# Patient Record
Sex: FEMALE | Race: WHITE | NOT HISPANIC OR LATINO | Employment: FULL TIME | URBAN - METROPOLITAN AREA
[De-identification: names, ages, dates, MRNs, and addresses within clinical notes are randomized per-mention and may not be internally consistent; named-entity substitution may affect disease eponyms.]

---

## 2017-01-12 ENCOUNTER — TELEPHONE (OUTPATIENT)
Dept: FAMILY MEDICINE CLINIC | Facility: CLINIC | Age: 52
End: 2017-01-12

## 2017-01-12 NOTE — TELEPHONE ENCOUNTER
ab please check on this it looks like it was suppose to be done on 12/12/16 at [facility]. It is on your over due task. thanks

## 2017-01-18 ENCOUNTER — TELEPHONE (OUTPATIENT)
Dept: FAMILY MEDICINE CLINIC | Facility: CLINIC | Age: 52
End: 2017-01-18

## 2017-01-18 NOTE — TELEPHONE ENCOUNTER
Ebony, please check with [facility] from December we dont have the report yet. This is in your overdue task thanks

## 2017-03-22 RX ORDER — LEVOTHYROXINE SODIUM 112 UG/1
TABLET ORAL
Qty: 90 TABLET | Refills: 0 | Status: SHIPPED | OUTPATIENT
Start: 2017-03-22 | End: 2017-06-13 | Stop reason: SDUPTHER

## 2017-05-18 ENCOUNTER — APPOINTMENT (OUTPATIENT)
Dept: WOMENS IMAGING | Facility: HOSPITAL | Age: 52
End: 2017-05-18

## 2017-05-18 PROCEDURE — 77067 SCR MAMMO BI INCL CAD: CPT | Performed by: RADIOLOGY

## 2017-06-02 ENCOUNTER — OFFICE (AMBULATORY)
Dept: URBAN - METROPOLITAN AREA CLINIC 1 | Facility: CLINIC | Age: 52
End: 2017-06-02

## 2017-06-02 VITALS
SYSTOLIC BLOOD PRESSURE: 128 MMHG | DIASTOLIC BLOOD PRESSURE: 74 MMHG | HEIGHT: 61 IN | WEIGHT: 149 LBS | HEART RATE: 82 BPM

## 2017-06-02 DIAGNOSIS — R94.5 ABNORMAL RESULTS OF LIVER FUNCTION STUDIES: ICD-10-CM

## 2017-06-02 DIAGNOSIS — Z86.010 PERSONAL HISTORY OF COLONIC POLYPS: ICD-10-CM

## 2017-06-02 PROCEDURE — 99214 OFFICE O/P EST MOD 30 MIN: CPT | Performed by: INTERNAL MEDICINE

## 2017-06-13 NOTE — TELEPHONE ENCOUNTER
lov 12/6/2016    Levothyroxine     reza     *transferred pt up front to make future appt and labs

## 2017-06-14 RX ORDER — LEVOTHYROXINE SODIUM 112 UG/1
TABLET ORAL
Qty: 90 TABLET | Refills: 0 | Status: SHIPPED | OUTPATIENT
Start: 2017-06-14 | End: 2017-08-01 | Stop reason: SDUPTHER

## 2017-07-24 DIAGNOSIS — R73.9 HYPERGLYCEMIA: Primary | ICD-10-CM

## 2017-07-24 DIAGNOSIS — E03.9 HYPOTHYROIDISM, UNSPECIFIED TYPE: ICD-10-CM

## 2017-07-26 LAB
HBA1C MFR BLD: 5.3 % (ref 4.8–5.6)
T3FREE SERPL-MCNC: 3.1 PG/ML (ref 2–4.4)
T4 FREE SERPL-MCNC: 1.71 NG/DL (ref 0.82–1.77)
TSH SERPL DL<=0.005 MIU/L-ACNC: 0.4 UIU/ML (ref 0.45–4.5)

## 2017-07-29 LAB
ALBUMIN SERPL-MCNC: 4.5 G/DL (ref 3.5–5.5)
ALBUMIN/GLOB SERPL: 1.7 {RATIO} (ref 1.2–2.2)
ALP SERPL-CCNC: 76 IU/L (ref 39–117)
ALT SERPL-CCNC: 20 IU/L (ref 0–32)
AST SERPL-CCNC: 28 IU/L (ref 0–40)
BILIRUB SERPL-MCNC: 0.6 MG/DL (ref 0–1.2)
BUN SERPL-MCNC: 10 MG/DL (ref 6–24)
BUN/CREAT SERPL: 15 (ref 9–23)
CALCIUM SERPL-MCNC: 9.9 MG/DL (ref 8.7–10.2)
CHLORIDE SERPL-SCNC: 100 MMOL/L (ref 96–106)
CO2 SERPL-SCNC: 17 MMOL/L (ref 18–29)
CREAT SERPL-MCNC: 0.66 MG/DL (ref 0.57–1)
GLOBULIN SER CALC-MCNC: 2.7 G/DL (ref 1.5–4.5)
GLUCOSE SERPL-MCNC: 94 MG/DL (ref 65–99)
Lab: NORMAL
POTASSIUM SERPL-SCNC: 5.2 MMOL/L (ref 3.5–5.2)
PROT SERPL-MCNC: 7.2 G/DL (ref 6–8.5)
SODIUM SERPL-SCNC: 141 MMOL/L (ref 134–144)
WRITTEN AUTHORIZATION: NORMAL

## 2017-08-01 ENCOUNTER — OFFICE VISIT (OUTPATIENT)
Dept: FAMILY MEDICINE CLINIC | Facility: CLINIC | Age: 52
End: 2017-08-01

## 2017-08-01 VITALS
DIASTOLIC BLOOD PRESSURE: 80 MMHG | WEIGHT: 147.2 LBS | RESPIRATION RATE: 18 BRPM | OXYGEN SATURATION: 97 % | HEART RATE: 79 BPM | HEIGHT: 61 IN | BODY MASS INDEX: 27.79 KG/M2 | SYSTOLIC BLOOD PRESSURE: 116 MMHG | TEMPERATURE: 98.8 F

## 2017-08-01 DIAGNOSIS — E78.5 HYPERLIPIDEMIA, UNSPECIFIED HYPERLIPIDEMIA TYPE: Primary | ICD-10-CM

## 2017-08-01 DIAGNOSIS — L30.0 NUMMULAR ECZEMA: ICD-10-CM

## 2017-08-01 DIAGNOSIS — R73.9 HYPERGLYCEMIA: ICD-10-CM

## 2017-08-01 DIAGNOSIS — E03.9 HYPOTHYROIDISM, UNSPECIFIED TYPE: ICD-10-CM

## 2017-08-01 DIAGNOSIS — R51.9 FREQUENT HEADACHES: ICD-10-CM

## 2017-08-01 LAB
CHOLEST SERPL-MCNC: 187 MG/DL (ref 0–200)
HDLC SERPL-MCNC: 37 MG/DL (ref 40–60)
LDLC SERPL CALC-MCNC: 113 MG/DL (ref 0–100)
LDLC/HDLC SERPL: 3.06 {RATIO}
TRIGL SERPL-MCNC: 184 MG/DL (ref 0–150)
VLDLC SERPL CALC-MCNC: 36.8 MG/DL (ref 5–40)

## 2017-08-01 PROCEDURE — 99214 OFFICE O/P EST MOD 30 MIN: CPT | Performed by: INTERNAL MEDICINE

## 2017-08-01 RX ORDER — LEVOTHYROXINE SODIUM 112 UG/1
112 TABLET ORAL DAILY
Qty: 90 TABLET | Refills: 1 | Status: SHIPPED | OUTPATIENT
Start: 2017-08-01 | End: 2018-02-25 | Stop reason: SDUPTHER

## 2017-08-01 RX ORDER — FLUTICASONE PROPIONATE 0.05 %
CREAM (GRAM) TOPICAL 2 TIMES DAILY
Qty: 50 G | Refills: 0 | Status: SHIPPED | OUTPATIENT
Start: 2017-08-01 | End: 2018-09-17

## 2017-08-01 NOTE — PROGRESS NOTES
"Subjective   Yomaira Ball is a 51 y.o. female who comes in today for   Chief Complaint   Patient presents with   • Hypothyroidism   .    History of Present Illness   Here to f/u on HT.  Fell Dec 5th 2016 and hurt her right knee tearing ACL, meniscus and condyle of knee and has had 2 surgeries.  Is finally improving and is still doing PT.  Is moving around better and getting some limited exercise.  Pain and movement is improved.  Had some depression when she was going through her recovery from her knee.  Has somewhat fallen off the wagon with her eating.  HA's are \"pretty good\".  MRI brain was reviewed from Dec. And normal other than small vessel changes c/w migraines.     The following portions of the patient's history were reviewed and updated as appropriate: allergies, current medications, past family history, past medical history, past social history, past surgical history and problem list.    Review of Systems   Constitutional: Positive for unexpected weight change.   Musculoskeletal: Positive for arthralgias (with her knee).       Vitals:    08/01/17 0812   BP: 116/80   Pulse: 79   Resp: 18   Temp: 98.8 °F (37.1 °C)   SpO2: 97%       Objective   Physical Exam   Constitutional: She is oriented to person, place, and time. She appears well-developed and well-nourished.   HENT:   Head: Normocephalic and atraumatic.   Right Ear: External ear normal.   Left Ear: External ear normal.   Mouth/Throat: Oropharynx is clear and moist.   Eyes: Conjunctivae are normal.   Neck: Neck supple.   Cardiovascular: Normal rate, regular rhythm and normal heart sounds.    Pulmonary/Chest: Effort normal and breath sounds normal.   Abdominal: Soft. Bowel sounds are normal.   Neurological: She is alert and oriented to person, place, and time.   Skin: Skin is warm.   Circular red raised rash on her left abdomen about the size of silver dollar   Psychiatric: She has a normal mood and affect. Her behavior is normal. Judgment and thought " content normal.   Nursing note and vitals reviewed.      Assessment/Plan   Yomaira was seen today for hypothyroidism.    Diagnoses and all orders for this visit:    Hyperlipidemia, unspecified hyperlipidemia type  -     Lipid Panel With LDL / HDL Ratio    Hyperglycemia    Hypothyroidism, unspecified type    Nummular eczema    Frequent headaches    Other orders  -     fluticasone (CUTIVATE) 0.05 % cream; Apply  topically 2 (Two) Times a Day. 7-10 days  -     levothyroxine (SYNTHROID, LEVOTHROID) 112 MCG tablet; Take 1 tablet by mouth Daily.      Eczema--start cutivate topically  Refill levoxyl  Labs reviewed with patient  HA's improved.                   I have asked for the patient to return to clinic in 6month(s).

## 2017-11-17 ENCOUNTER — TRANSCRIBE ORDERS (OUTPATIENT)
Dept: ADMINISTRATIVE | Facility: HOSPITAL | Age: 52
End: 2017-11-17

## 2017-11-17 ENCOUNTER — OFFICE (AMBULATORY)
Dept: URBAN - METROPOLITAN AREA CLINIC 1 | Facility: CLINIC | Age: 52
End: 2017-11-17
Payer: COMMERCIAL

## 2017-11-17 ENCOUNTER — LAB (OUTPATIENT)
Dept: LAB | Facility: HOSPITAL | Age: 52
End: 2017-11-17

## 2017-11-17 VITALS
HEIGHT: 61 IN | WEIGHT: 148 LBS | SYSTOLIC BLOOD PRESSURE: 128 MMHG | DIASTOLIC BLOOD PRESSURE: 72 MMHG | HEART RATE: 72 BPM

## 2017-11-17 DIAGNOSIS — R94.5 ABNORMAL RESULTS OF LIVER FUNCTION STUDIES: ICD-10-CM

## 2017-11-17 DIAGNOSIS — Z86.010 HISTORY OF COLONIC POLYPS: ICD-10-CM

## 2017-11-17 DIAGNOSIS — K75.81 NONALCOHOLIC STEATOHEPATITIS (NASH): ICD-10-CM

## 2017-11-17 DIAGNOSIS — K21.9 GASTRO-ESOPHAGEAL REFLUX DISEASE WITHOUT ESOPHAGITIS: ICD-10-CM

## 2017-11-17 DIAGNOSIS — K75.81 NONALCOHOLIC STEATOHEPATITIS: ICD-10-CM

## 2017-11-17 DIAGNOSIS — Z86.010 PERSONAL HISTORY OF COLONIC POLYPS: ICD-10-CM

## 2017-11-17 DIAGNOSIS — R94.5 ABNORMAL RESULTS OF LIVER FUNCTION STUDIES: Primary | ICD-10-CM

## 2017-11-17 LAB
ALBUMIN SERPL-MCNC: 4.7 G/DL (ref 3.5–5.2)
ALP SERPL-CCNC: 74 U/L (ref 39–117)
ALT SERPL W P-5'-P-CCNC: 39 U/L (ref 1–33)
AST SERPL-CCNC: 23 U/L (ref 1–32)
BILIRUB CONJ SERPL-MCNC: <0.2 MG/DL (ref 0–0.3)
BILIRUB INDIRECT SERPL-MCNC: ABNORMAL MG/DL
BILIRUB SERPL-MCNC: 1.3 MG/DL (ref 0.1–1.2)
PROT SERPL-MCNC: 7.4 G/DL (ref 6–8.5)

## 2017-11-17 PROCEDURE — 99213 OFFICE O/P EST LOW 20 MIN: CPT | Performed by: INTERNAL MEDICINE

## 2017-11-17 PROCEDURE — 80076 HEPATIC FUNCTION PANEL: CPT

## 2017-11-17 PROCEDURE — 36415 COLL VENOUS BLD VENIPUNCTURE: CPT

## 2017-11-17 NOTE — SERVICEHPINOTES
Ms. Jernigan presents for followup. She has a history of having a serrated polyp in the cecum that required surgical resection. She does tell me her bowels vary from normal to constipation to loose. The torsion may want to try a probiotic or fiber to try and regulate her bowels. She occasionally has blood on the tissue but only with constipation. There is no blood in the stool of water. We talked about colonoscopy. She tells me that she had a followup colonoscopy a year after her surgery, that actually was 3 years ago so looks like she is due for a three-year followup exam. She understands and agrees. There is no family history of polyps or colon cancer.She also has a history of nonalcoholic fatty liver disease. She had blood work done in August, her transaminases were normal at that time. I would like to update her blood work today. She still recovering to some degree from her knee surgery, she says she's unfortunately gained weight throughout the ordeal. She was able to initially lose about 20 pounds but she is gaining 10 to 15 pounds back. She also has situational reflux. She says that with weight gain her reflux is a little worse. She has no old or arm symptoms. She is going to try to focus on weight loss. There is no dysphagia, odynophagia, nausea, vomiting melena or hematemesis. She is in no distress, she does not look acutely ill. Otherwise there is no change in her past medical or past surgical history.

## 2017-11-27 VITALS
HEART RATE: 77 BPM | RESPIRATION RATE: 21 BRPM | HEIGHT: 61 IN | HEART RATE: 68 BPM | DIASTOLIC BLOOD PRESSURE: 61 MMHG | DIASTOLIC BLOOD PRESSURE: 79 MMHG | RESPIRATION RATE: 20 BRPM | RESPIRATION RATE: 17 BRPM | DIASTOLIC BLOOD PRESSURE: 86 MMHG | OXYGEN SATURATION: 100 % | SYSTOLIC BLOOD PRESSURE: 106 MMHG | OXYGEN SATURATION: 94 % | DIASTOLIC BLOOD PRESSURE: 64 MMHG | DIASTOLIC BLOOD PRESSURE: 65 MMHG | SYSTOLIC BLOOD PRESSURE: 134 MMHG | HEART RATE: 78 BPM | SYSTOLIC BLOOD PRESSURE: 113 MMHG | RESPIRATION RATE: 16 BRPM | OXYGEN SATURATION: 98 % | DIASTOLIC BLOOD PRESSURE: 62 MMHG | WEIGHT: 148 LBS | DIASTOLIC BLOOD PRESSURE: 72 MMHG | TEMPERATURE: 97.8 F | SYSTOLIC BLOOD PRESSURE: 119 MMHG | OXYGEN SATURATION: 99 % | SYSTOLIC BLOOD PRESSURE: 105 MMHG | TEMPERATURE: 97.2 F | HEART RATE: 76 BPM | SYSTOLIC BLOOD PRESSURE: 109 MMHG | SYSTOLIC BLOOD PRESSURE: 121 MMHG | HEART RATE: 71 BPM | SYSTOLIC BLOOD PRESSURE: 118 MMHG

## 2017-11-28 ENCOUNTER — AMBULATORY SURGICAL CENTER (AMBULATORY)
Dept: URBAN - METROPOLITAN AREA SURGERY 17 | Facility: SURGERY | Age: 52
End: 2017-11-28
Payer: COMMERCIAL

## 2017-11-28 DIAGNOSIS — Z86.010 PERSONAL HISTORY OF COLONIC POLYPS: ICD-10-CM

## 2017-11-28 DIAGNOSIS — K64.8 OTHER HEMORRHOIDS: ICD-10-CM

## 2017-11-28 PROBLEM — Z12.11 SURVEILLANCE DUE TO PRIOR COLONIC NEOPLASIA: Status: ACTIVE | Noted: 2017-11-28

## 2017-11-28 PROCEDURE — 45378 DIAGNOSTIC COLONOSCOPY: CPT | Mod: 33 | Performed by: INTERNAL MEDICINE

## 2017-11-28 RX ADMIN — PROPOFOL 50 MG: 10 INJECTION, EMULSION INTRAVENOUS at 07:03

## 2017-11-28 RX ADMIN — LIDOCAINE HYDROCHLORIDE 50 MG: 10 INJECTION, SOLUTION EPIDURAL; INFILTRATION; INTRACAUDAL; PERINEURAL at 07:00

## 2017-11-28 RX ADMIN — PROPOFOL 50 MG: 10 INJECTION, EMULSION INTRAVENOUS at 07:07

## 2017-11-28 RX ADMIN — PROPOFOL 50 MG: 10 INJECTION, EMULSION INTRAVENOUS at 07:09

## 2017-11-28 RX ADMIN — PROPOFOL 50 MG: 10 INJECTION, EMULSION INTRAVENOUS at 07:05

## 2017-11-28 RX ADMIN — PROPOFOL 100 MG: 10 INJECTION, EMULSION INTRAVENOUS at 07:01

## 2017-11-28 RX ADMIN — PROPOFOL 50 MG: 10 INJECTION, EMULSION INTRAVENOUS at 07:11

## 2018-01-04 ENCOUNTER — OFFICE VISIT (OUTPATIENT)
Dept: FAMILY MEDICINE CLINIC | Facility: CLINIC | Age: 53
End: 2018-01-04

## 2018-01-04 VITALS
WEIGHT: 156.3 LBS | BODY MASS INDEX: 29.51 KG/M2 | HEART RATE: 98 BPM | TEMPERATURE: 98.3 F | DIASTOLIC BLOOD PRESSURE: 65 MMHG | OXYGEN SATURATION: 98 % | HEIGHT: 61 IN | SYSTOLIC BLOOD PRESSURE: 115 MMHG

## 2018-01-04 DIAGNOSIS — I88.9 LYMPHADENITIS: ICD-10-CM

## 2018-01-04 DIAGNOSIS — J40 BRONCHITIS: Primary | ICD-10-CM

## 2018-01-04 LAB
BASOPHILS # BLD AUTO: 0.04 10*3/MM3 (ref 0–0.2)
BASOPHILS NFR BLD AUTO: 0.6 % (ref 0–1.5)
EOSINOPHIL # BLD AUTO: 0.16 10*3/MM3 (ref 0–0.7)
EOSINOPHIL NFR BLD AUTO: 2.4 % (ref 0.3–6.2)
ERYTHROCYTE [DISTWIDTH] IN BLOOD BY AUTOMATED COUNT: 12.8 % (ref 11.7–13)
HCT VFR BLD AUTO: 47.7 % (ref 35.6–45.5)
HGB BLD-MCNC: 15.7 G/DL (ref 11.9–15.5)
IMM GRANULOCYTES # BLD: 0.1 10*3/MM3 (ref 0–0.03)
IMM GRANULOCYTES NFR BLD: 1.5 % (ref 0–0.5)
LYMPHOCYTES # BLD AUTO: 1.76 10*3/MM3 (ref 0.9–4.8)
LYMPHOCYTES NFR BLD AUTO: 26 % (ref 19.6–45.3)
MCH RBC QN AUTO: 30 PG (ref 26.9–32)
MCHC RBC AUTO-ENTMCNC: 32.9 G/DL (ref 32.4–36.3)
MCV RBC AUTO: 91 FL (ref 80.5–98.2)
MONOCYTES # BLD AUTO: 0.51 10*3/MM3 (ref 0.2–1.2)
MONOCYTES NFR BLD AUTO: 7.5 % (ref 5–12)
NEUTROPHILS # BLD AUTO: 4.21 10*3/MM3 (ref 1.9–8.1)
NEUTROPHILS NFR BLD AUTO: 62 % (ref 42.7–76)
PLATELET # BLD AUTO: 282 10*3/MM3 (ref 140–500)
RBC # BLD AUTO: 5.24 10*6/MM3 (ref 3.9–5.2)
WBC # BLD AUTO: 6.78 10*3/MM3 (ref 4.5–10.7)

## 2018-01-04 PROCEDURE — 99213 OFFICE O/P EST LOW 20 MIN: CPT | Performed by: NURSE PRACTITIONER

## 2018-01-04 RX ORDER — BENZONATATE 100 MG/1
CAPSULE ORAL
Qty: 60 CAPSULE | Refills: 0 | Status: SHIPPED | OUTPATIENT
Start: 2018-01-04 | End: 2018-09-17

## 2018-01-04 RX ORDER — METHYLPREDNISOLONE 4 MG/1
TABLET ORAL
Qty: 21 TABLET | Refills: 0 | Status: SHIPPED | OUTPATIENT
Start: 2018-01-04 | End: 2018-09-17

## 2018-01-04 NOTE — PATIENT INSTRUCTIONS
Apply warm compresses to neck as needed.  Continue doxycycline.  CBC today.  Start Medrol dose pack.   For worsening symptoms, Follow up with Dr. Martinez

## 2018-01-04 NOTE — PROGRESS NOTES
Subjective   Yomaira Ball is a 52 y.o. female presents with Reji symptoms that started on 12/21 and progressed into chest discomfort on 12/27. Treated at the [facility] and tested negative for flu/strep. Was treated with doxycycline and OTC meds. Now with left glands swelling.     URI    This is a new problem. The current episode started 1 to 4 weeks ago. The problem has been waxing and waning. The maximum temperature recorded prior to her arrival was 100.4 - 100.9 F. The fever has been present for 1 to 2 days. Associated symptoms include congestion, coughing, ear pain, a plugged ear sensation, rhinorrhea, sinus pain, a sore throat and wheezing (a few nights ago). Pertinent negatives include no abdominal pain, chest pain, diarrhea, dysuria, headaches, joint pain, joint swelling, nausea, neck pain, rash, sneezing, swollen glands or vomiting. She has tried antihistamine and acetaminophen (antibiotic) for the symptoms. The treatment provided mild relief.        The following portions of the patient's history were reviewed and updated as appropriate: allergies, current medications, past family history, past medical history, past social history, past surgical history and problem list.    Review of Systems   Constitutional: Positive for activity change (decreased), fatigue and fever (resolved). Negative for appetite change.   HENT: Positive for congestion, ear pain, postnasal drip, rhinorrhea, sinus pain, sinus pressure and sore throat. Negative for ear discharge and sneezing.    Eyes: Negative.    Respiratory: Positive for cough, chest tightness, shortness of breath (with exertion) and wheezing (a few nights ago).    Cardiovascular: Negative.  Negative for chest pain.   Gastrointestinal: Negative.  Negative for abdominal pain, diarrhea, nausea and vomiting.   Endocrine: Negative.    Genitourinary: Negative.  Negative for dysuria.   Musculoskeletal: Negative.  Negative for joint pain and neck pain.   Skin: Negative.   Negative for rash.   Allergic/Immunologic: Negative.    Neurological: Negative.  Negative for headaches.   Hematological: Negative.    Psychiatric/Behavioral: Negative.        Objective   Physical Exam   Constitutional: She is oriented to person, place, and time. She appears well-developed and well-nourished.   HENT:   Head: Normocephalic and atraumatic.   Right Ear: External ear and ear canal normal. Tympanic membrane is erythematous.   Left Ear: External ear and ear canal normal. Tympanic membrane is erythematous.   Nose: Mucosal edema present. Right sinus exhibits no maxillary sinus tenderness and no frontal sinus tenderness. Left sinus exhibits no maxillary sinus tenderness and no frontal sinus tenderness.   Mouth/Throat: Uvula is midline and mucous membranes are normal. Posterior oropharyngeal edema and posterior oropharyngeal erythema present. No tonsillar exudate.   Eyes: Conjunctivae are normal. Pupils are equal, round, and reactive to light.   Neck: Neck supple.   Cardiovascular: Normal rate, regular rhythm and normal heart sounds.  Exam reveals no gallop and no friction rub.    No murmur heard.  Pulmonary/Chest: Effort normal and breath sounds normal. No respiratory distress. She has no wheezes. She has no rales.   Abdominal: Soft. Bowel sounds are normal. She exhibits no distension. There is no tenderness.   Lymphadenopathy:     She has cervical adenopathy.        Left cervical: Superficial cervical adenopathy present.   Neurological: She is alert and oriented to person, place, and time.   Skin: Skin is warm and dry.   Psychiatric: She has a normal mood and affect.   Vitals reviewed.      Assessment/Plan   Yomaira was seen today for uri.    Diagnoses and all orders for this visit:    Bronchitis  -     CBC & Differential    Lymphadenitis  -     CBC & Differential    Other orders  -     benzonatate (TESSALON PERLES) 100 MG capsule; Use 1-2 capsules every 8 hours as needed for cough  -     MethylPREDNISolone  (MEDROL, RAMAN,) 4 MG tablet; Take as directed on package instructions.

## 2018-02-26 RX ORDER — LEVOTHYROXINE SODIUM 112 UG/1
TABLET ORAL
Qty: 29 TABLET | Refills: 5 | Status: SHIPPED | OUTPATIENT
Start: 2018-02-26 | End: 2018-06-21 | Stop reason: SDUPTHER

## 2018-05-21 ENCOUNTER — APPOINTMENT (OUTPATIENT)
Dept: WOMENS IMAGING | Facility: HOSPITAL | Age: 53
End: 2018-05-21

## 2018-05-21 PROCEDURE — 77067 SCR MAMMO BI INCL CAD: CPT | Performed by: RADIOLOGY

## 2018-06-21 NOTE — TELEPHONE ENCOUNTER
Last visit 8/1/2017    Levothyroxine     [phone]--left message for pt to call and make an appt with labs

## 2018-06-22 RX ORDER — LEVOTHYROXINE SODIUM 112 UG/1
TABLET ORAL
Qty: 90 TABLET | Refills: 0 | Status: SHIPPED | OUTPATIENT
Start: 2018-06-22 | End: 2018-09-06 | Stop reason: SDUPTHER

## 2018-08-27 DIAGNOSIS — E78.5 HYPERLIPIDEMIA, UNSPECIFIED HYPERLIPIDEMIA TYPE: Primary | ICD-10-CM

## 2018-08-27 DIAGNOSIS — R73.9 HYPERGLYCEMIA: ICD-10-CM

## 2018-08-27 DIAGNOSIS — E03.9 HYPOTHYROIDISM, UNSPECIFIED TYPE: ICD-10-CM

## 2018-09-01 LAB
ALBUMIN SERPL-MCNC: 4.7 G/DL (ref 3.5–5.2)
ALBUMIN/GLOB SERPL: 1.9 G/DL
ALP SERPL-CCNC: 69 U/L (ref 39–117)
ALT SERPL-CCNC: 30 U/L (ref 1–33)
AST SERPL-CCNC: 20 U/L (ref 1–32)
BASOPHILS # BLD AUTO: 0.06 10*3/MM3 (ref 0–0.2)
BASOPHILS NFR BLD AUTO: 1 % (ref 0–1.5)
BILIRUB SERPL-MCNC: 1.1 MG/DL (ref 0.1–1.2)
BUN SERPL-MCNC: 14 MG/DL (ref 6–20)
BUN/CREAT SERPL: 20.3 (ref 7–25)
CALCIUM SERPL-MCNC: 9.9 MG/DL (ref 8.6–10.5)
CHLORIDE SERPL-SCNC: 100 MMOL/L (ref 98–107)
CHOLEST SERPL-MCNC: 156 MG/DL (ref 0–200)
CO2 SERPL-SCNC: 28.5 MMOL/L (ref 22–29)
CREAT SERPL-MCNC: 0.69 MG/DL (ref 0.57–1)
EOSINOPHIL # BLD AUTO: 0.18 10*3/MM3 (ref 0–0.7)
EOSINOPHIL NFR BLD AUTO: 2.9 % (ref 0.3–6.2)
ERYTHROCYTE [DISTWIDTH] IN BLOOD BY AUTOMATED COUNT: 13.3 % (ref 11.7–13)
GLOBULIN SER CALC-MCNC: 2.5 GM/DL
GLUCOSE SERPL-MCNC: 95 MG/DL (ref 65–99)
HBA1C MFR BLD: 5.4 % (ref 4.8–5.6)
HCT VFR BLD AUTO: 48.5 % (ref 35.6–45.5)
HDLC SERPL-MCNC: 44 MG/DL (ref 40–60)
HGB BLD-MCNC: 15.6 G/DL (ref 11.9–15.5)
IMM GRANULOCYTES # BLD: 0.03 10*3/MM3 (ref 0–0.03)
IMM GRANULOCYTES NFR BLD: 0.5 % (ref 0–0.5)
LDLC SERPL CALC-MCNC: 88 MG/DL (ref 0–100)
LDLC/HDLC SERPL: 2 {RATIO}
LYMPHOCYTES # BLD AUTO: 2.23 10*3/MM3 (ref 0.9–4.8)
LYMPHOCYTES NFR BLD AUTO: 36.1 % (ref 19.6–45.3)
MCH RBC QN AUTO: 29.5 PG (ref 26.9–32)
MCHC RBC AUTO-ENTMCNC: 32.2 G/DL (ref 32.4–36.3)
MCV RBC AUTO: 91.9 FL (ref 80.5–98.2)
MONOCYTES # BLD AUTO: 0.45 10*3/MM3 (ref 0.2–1.2)
MONOCYTES NFR BLD AUTO: 7.3 % (ref 5–12)
NEUTROPHILS # BLD AUTO: 3.23 10*3/MM3 (ref 1.9–8.1)
NEUTROPHILS NFR BLD AUTO: 52.2 % (ref 42.7–76)
PLATELET # BLD AUTO: 225 10*3/MM3 (ref 140–500)
POTASSIUM SERPL-SCNC: 4.5 MMOL/L (ref 3.5–5.2)
PROT SERPL-MCNC: 7.2 G/DL (ref 6–8.5)
RBC # BLD AUTO: 5.28 10*6/MM3 (ref 3.9–5.2)
SODIUM SERPL-SCNC: 140 MMOL/L (ref 136–145)
T3FREE SERPL-MCNC: 2.9 PG/ML (ref 2–4.4)
T4 FREE SERPL-MCNC: 1.35 NG/DL (ref 0.93–1.7)
TRIGL SERPL-MCNC: 119 MG/DL (ref 0–150)
TSH SERPL DL<=0.005 MIU/L-ACNC: 2.06 MIU/ML (ref 0.27–4.2)
VLDLC SERPL CALC-MCNC: 23.8 MG/DL (ref 5–40)
WBC # BLD AUTO: 6.18 10*3/MM3 (ref 4.5–10.7)

## 2018-09-06 ENCOUNTER — OFFICE VISIT (OUTPATIENT)
Dept: FAMILY MEDICINE CLINIC | Facility: CLINIC | Age: 53
End: 2018-09-06

## 2018-09-06 VITALS
BODY MASS INDEX: 28.6 KG/M2 | RESPIRATION RATE: 18 BRPM | HEIGHT: 61 IN | TEMPERATURE: 98.4 F | DIASTOLIC BLOOD PRESSURE: 68 MMHG | HEART RATE: 78 BPM | OXYGEN SATURATION: 98 % | WEIGHT: 151.5 LBS | SYSTOLIC BLOOD PRESSURE: 110 MMHG

## 2018-09-06 DIAGNOSIS — R73.9 HYPERGLYCEMIA: ICD-10-CM

## 2018-09-06 DIAGNOSIS — E78.5 HYPERLIPIDEMIA, UNSPECIFIED HYPERLIPIDEMIA TYPE: ICD-10-CM

## 2018-09-06 DIAGNOSIS — E03.9 HYPOTHYROIDISM, UNSPECIFIED TYPE: Primary | ICD-10-CM

## 2018-09-06 PROCEDURE — 99213 OFFICE O/P EST LOW 20 MIN: CPT | Performed by: INTERNAL MEDICINE

## 2018-09-06 RX ORDER — LEVOTHYROXINE SODIUM 112 UG/1
112 TABLET ORAL DAILY
Qty: 90 TABLET | Refills: 3 | Status: SHIPPED | OUTPATIENT
Start: 2018-09-06 | End: 2019-07-30 | Stop reason: SDUPTHER

## 2018-09-06 NOTE — PROGRESS NOTES
Subjective   Yomaira Ball is a 52 y.o. female who comes in today for   Chief Complaint   Patient presents with   • Hyperlipidemia   • Hypothyroidism   .    History of Present Illness   Here for f/u on HL diet controlled and HT on levoxyl 112mcg qd . mammo and pap were 2 mo ago and normal.  cscope was 2015.  Still having trouble with her right knee after her injury last year.  Struggling with pain and disfunction also in the left knee now.  Not exercising.  Has some skin spots that need to be addressed    The following portions of the patient's history were reviewed and updated as appropriate: allergies, current medications, past family history, past medical history, past social history, past surgical history and problem list.    Review of Systems   Constitutional: Negative.    Skin:        One spot on her arm that is scaling and not healing; lesion in her scalp as well   Psychiatric/Behavioral: Negative.        Vitals:    09/06/18 1424   BP: 110/68   Pulse: 78   Resp: 18   Temp: 98.4 °F (36.9 °C)   SpO2: 98%       Objective   Physical Exam   Constitutional: She is oriented to person, place, and time. She appears well-developed and well-nourished.   HENT:   Head: Normocephalic and atraumatic.   Right Ear: External ear normal.   Left Ear: External ear normal.   Mouth/Throat: Oropharynx is clear and moist.   Eyes: Conjunctivae are normal.   Neck: Neck supple.   Cardiovascular: Normal rate, regular rhythm and normal heart sounds.    No bruits   Pulmonary/Chest: Effort normal and breath sounds normal. No respiratory distress. She has no wheezes. She has no rales.   Abdominal: Soft. Bowel sounds are normal. She exhibits no distension and no mass. There is no tenderness.   Lymphadenopathy:     She has no cervical adenopathy.   Neurological: She is alert and oriented to person, place, and time.   Skin: Skin is warm.   seb keratosis scalp and forearm lesion looks like a wart possibly   Psychiatric: She has a normal mood  and affect. Her behavior is normal. Judgment and thought content normal.   Nursing note and vitals reviewed.      Assessment/Plan   Yomaira was seen today for hyperlipidemia and hypothyroidism.    Diagnoses and all orders for this visit:    Hypothyroidism, unspecified type    Hyperlipidemia, unspecified hyperlipidemia type    Hyperglycemia    Other orders  -     levothyroxine (SYNTHROID, LEVOTHROID) 112 MCG tablet; Take 1 tablet by mouth Daily.      Refer her back to derm Dr. Perkins for skin check  Refill levoxyl 112mcg qd  Labs reviewed and look excellent  HL controlled with diet  RTC 9 mo for CPE  F/u ortho for her knees             I have asked for the patient to return to clinic in 6month(s).

## 2018-09-17 ENCOUNTER — OFFICE VISIT (OUTPATIENT)
Dept: FAMILY MEDICINE CLINIC | Facility: CLINIC | Age: 53
End: 2018-09-17

## 2018-09-17 VITALS
RESPIRATION RATE: 16 BRPM | TEMPERATURE: 97.6 F | WEIGHT: 147.9 LBS | OXYGEN SATURATION: 99 % | DIASTOLIC BLOOD PRESSURE: 80 MMHG | SYSTOLIC BLOOD PRESSURE: 124 MMHG | HEIGHT: 61 IN | BODY MASS INDEX: 27.93 KG/M2 | HEART RATE: 94 BPM

## 2018-09-17 DIAGNOSIS — M25.552 PAIN OF BOTH HIP JOINTS: ICD-10-CM

## 2018-09-17 DIAGNOSIS — M25.551 PAIN OF BOTH HIP JOINTS: ICD-10-CM

## 2018-09-17 DIAGNOSIS — R19.7 DIARRHEA, UNSPECIFIED TYPE: Primary | ICD-10-CM
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
## 2018-09-17 PROCEDURE — 99214 OFFICE O/P EST MOD 30 MIN: CPT | Performed by: FAMILY MEDICINE

## 2018-09-17 RX ORDER — LEVOTHYROXINE SODIUM 112 UG/1
TABLET ORAL
Qty: 90 TABLET | Refills: 1 | Status: SHIPPED | OUTPATIENT
Start: 2018-09-17 | End: 2019-07-30 | Stop reason: SDUPTHER

## 2018-09-17 RX ORDER — ONDANSETRON 8 MG/1
TABLET, ORALLY DISINTEGRATING ORAL
Qty: 12 TABLET | Refills: 1 | Status: SHIPPED | OUTPATIENT
Start: 2018-09-17 | End: 2019-07-30

## 2018-09-17 NOTE — PATIENT INSTRUCTIONS
Gatorade is good for rehydrating-frequent small amounts  Med for nausea-may take every 4 hours (rx says 6)  Imodium is fine-may use up to 8/day  ER if bad bleeding, pain, dizziness    We will contact you about labs    I would strongly encourage you to sign up for My Chart using the access code provided with these papers.  This provides an excellent convenient way for you to communicate with us and with any of your [facility] physicians.  Lab and x-ray reports can be viewed, prescription refills and appointments may be requested, and you may send emails to your physician's office.

## 2018-09-17 NOTE — TELEPHONE ENCOUNTER
Pt called and was in a week ago with knee pain. She didn't think about it then but in May she had a tick bite and is wondering if lyme disease because now she is also having hip pain swollen glands and fever. Do you want to see her or do labs

## 2018-09-17 NOTE — PROGRESS NOTES
Subjective   Yomaira Ball is a 52 y.o. female.     In with nausea diarrhea arthralgias headache fatigue onset 914.  Her usual bad achy headache.  She's had nausea but no vomiting.  Has had fairly profuse diarrhea and says that once yesterday she passed blood.  That has resolved.  She does have a history of hemorrhoids.  She is aching in her joints but particularly in her hips.  Has had problems lately with some lower extremity joints because of difficulty with her right knee.  Probably been doing some weight shifting she thinks.  Gets a little lightheaded if she gets up too quickly.  She has lost some weight in the last few days.  She's concerned because she had a tick bite about 3 months ago.  Did not get a typical stage one Lyme lesion.  She's noticed some tender posterior cervical lymph nodes also has an isolated right groin node.  No skin lesions associated with those.         The following portions of the patient's history were reviewed and updated as appropriate: allergies, current medications, past family history, past medical history, past social history, past surgical history and problem list.    Review of Systems   Constitutional: Positive for activity change, appetite change, fatigue and unexpected weight change. Negative for chills and fever.   HENT: Negative for congestion, rhinorrhea and sore throat.    Eyes: Negative for discharge and visual disturbance.   Respiratory: Negative for cough and shortness of breath.    Cardiovascular: Negative for chest pain.   Gastrointestinal: Positive for blood in stool, diarrhea and nausea. Negative for abdominal pain, constipation and vomiting.   Endocrine: Negative for polydipsia.   Genitourinary: Negative for dysuria and hematuria.   Musculoskeletal: Negative for arthralgias and myalgias.   Skin: Negative for rash.   Allergic/Immunologic: Negative.    Neurological: Positive for light-headedness. Negative for weakness, numbness and headaches.   Hematological:  Positive for adenopathy. Does not bruise/bleed easily.   Psychiatric/Behavioral: Negative for dysphoric mood. The patient is not nervous/anxious.    All other systems reviewed and are negative.      Objective   Physical Exam   Constitutional: She is oriented to person, place, and time. She appears well-developed and well-nourished.  Non-toxic appearance. No distress.   HENT:   Head: Normocephalic and atraumatic.   Right Ear: External ear normal.   Left Ear: External ear normal.   Mouth/Throat: Mucous membranes are normal. No oropharyngeal exudate.   Eyes: Pupils are equal, round, and reactive to light. Conjunctivae, EOM and lids are normal. Right eye exhibits no discharge. Left eye exhibits no discharge. No scleral icterus.   Neck: Trachea normal, normal range of motion and phonation normal. Neck supple. No thyromegaly present.   Cardiovascular: Normal rate, regular rhythm and normal heart sounds.  Exam reveals no gallop and no friction rub.    No murmur heard.  Pulmonary/Chest: Effort normal and breath sounds normal. No stridor. She has no wheezes. She has no rales.   Abdominal: Soft. She exhibits no distension. There is no tenderness.   Musculoskeletal: Normal range of motion. She exhibits no edema.   Lymphadenopathy:     She has cervical adenopathy.        Left cervical: Posterior cervical adenopathy present.   Neurological: She is alert and oriented to person, place, and time. She has normal strength. No cranial nerve deficit.   Skin: Skin is warm, dry and intact. No petechiae and no rash noted. No cyanosis. Nails show no clubbing.   Psychiatric: She has a normal mood and affect. Her speech is normal and behavior is normal. Judgment and thought content normal. Cognition and memory are normal.   Nursing note and vitals reviewed.    I suspect this is most likely viral with some mild volume depletion (weigh tis down 4 pounds since 9/6)  Will check for Lyme since she raises the issue.     Assessment/Plan   Yomaira was  seen today for fatigue, headache, diarrhea and joint pain.    Diagnoses and all orders for this visit:    Diarrhea, unspecified type  -     Basic Metabolic Panel  -     CBC w AUTO Differential    Pain of both hip joints  -     Lyme, Total Antibody Test / Reflex    Tick bite, initial encounter  -     CBC w AUTO Differential  -     Lyme, Total Antibody Test / Reflex    Other orders  -     ondansetron ODT (ZOFRAN-ODT) 8 MG disintegrating tablet; Dissolve one tablet on tongue every 6 hours for nausea and vomiting      Patient Instructions     Gatorade is good for rehydrating-frequent small amounts  Med for nausea-may take every 4 hours (rx says 6)  Imodium is fine-may use up to 8/day  ER if bad bleeding, pain, dizziness    We will contact you about labs    I would strongly encourage you to sign up for My Chart using the access code provided with these papers.  This provides an excellent convenient way for you to communicate with us and with any of your [facility] physicians.  Lab and x-ray reports can be viewed, prescription refills and appointments may be requested, and you may send emails to your physician's office.          She understands the nodes should all be gone in 6 weeks once recovered from acute illness, or be seen.     EMR Dragon/Transcription disclaimer:   Much of this encounter note is an electronic transcription/translation of spoken language to printed text. The electronic translation of spoken language may permit erroneous, or at times, nonsensical words or phrases to be inadvertently transcribed; Although I have reviewed the note for such errors, some may still exist. Please contact me with any questions or concerns about the conduct of this encounter note.

## 2018-09-18 LAB
B BURGDOR IGG+IGM SER-ACNC: <0.91 ISR (ref 0–0.9)
BASOPHILS # BLD AUTO: 0.02 10*3/MM3 (ref 0–0.2)
BASOPHILS NFR BLD AUTO: 0.2 % (ref 0–1.5)
BUN SERPL-MCNC: 9 MG/DL (ref 6–20)
BUN/CREAT SERPL: 13 (ref 7–25)
CALCIUM SERPL-MCNC: 10 MG/DL (ref 8.6–10.5)
CHLORIDE SERPL-SCNC: 99 MMOL/L (ref 98–107)
CO2 SERPL-SCNC: 29.6 MMOL/L (ref 22–29)
CREAT SERPL-MCNC: 0.69 MG/DL (ref 0.57–1)
EOSINOPHIL # BLD AUTO: 0.11 10*3/MM3 (ref 0–0.7)
EOSINOPHIL NFR BLD AUTO: 1.3 % (ref 0.3–6.2)
ERYTHROCYTE [DISTWIDTH] IN BLOOD BY AUTOMATED COUNT: 13.4 % (ref 11.7–13)
GLUCOSE SERPL-MCNC: 107 MG/DL (ref 65–99)
HCT VFR BLD AUTO: 45.1 % (ref 35.6–45.5)
HGB BLD-MCNC: 15 G/DL (ref 11.9–15.5)
IMM GRANULOCYTES # BLD: 0.02 10*3/MM3 (ref 0–0.03)
IMM GRANULOCYTES NFR BLD: 0.2 % (ref 0–0.5)
LYMPHOCYTES # BLD AUTO: 0.93 10*3/MM3 (ref 0.9–4.8)
LYMPHOCYTES NFR BLD AUTO: 11.3 % (ref 19.6–45.3)
MCH RBC QN AUTO: 29.9 PG (ref 26.9–32)
MCHC RBC AUTO-ENTMCNC: 33.3 G/DL (ref 32.4–36.3)
MCV RBC AUTO: 89.8 FL (ref 80.5–98.2)
MONOCYTES # BLD AUTO: 0.94 10*3/MM3 (ref 0.2–1.2)
MONOCYTES NFR BLD AUTO: 11.4 % (ref 5–12)
NEUTROPHILS # BLD AUTO: 6.25 10*3/MM3 (ref 1.9–8.1)
NEUTROPHILS NFR BLD AUTO: 75.8 % (ref 42.7–76)
PLATELET # BLD AUTO: 208 10*3/MM3 (ref 140–500)
POTASSIUM SERPL-SCNC: 4.1 MMOL/L (ref 3.5–5.2)
RBC # BLD AUTO: 5.02 10*6/MM3 (ref 3.9–5.2)
SODIUM SERPL-SCNC: 141 MMOL/L (ref 136–145)
WBC # BLD AUTO: 8.25 10*3/MM3 (ref 4.5–10.7)

## 2018-09-20 ENCOUNTER — TELEPHONE (OUTPATIENT)
Dept: FAMILY MEDICINE CLINIC | Facility: CLINIC | Age: 53
End: 2018-09-20

## 2018-09-20 NOTE — TELEPHONE ENCOUNTER
Pt has been running a fever on and off since Friday. She got her lab results back and is still concerned. Her fever seems to spike in the middle of the night. She is wondering if she should be concerned the headaches have gone away but the fevers have not, does she need to come back in or what do you recommend.

## 2018-09-20 NOTE — TELEPHONE ENCOUNTER
Telephone message received-Intermittent fever continues.  Headaches resolved and labs good.  She is concerned.  This sounds like oriented in the right direction slowly.  I think it's reasonable to give at least a full week for a significant viral illness resolved.  Obviously we are happy to try to take a look at things of any point that she wants.  However he would be reasonable to give things until Monday and then if problems continue to reevaluate

## 2019-04-01 RX ORDER — LEVOTHYROXINE SODIUM 112 UG/1
TABLET ORAL
Qty: 90 TABLET | Refills: 0 | Status: SHIPPED | OUTPATIENT
Start: 2019-04-01 | End: 2019-07-07 | Stop reason: SDUPTHER

## 2019-05-22 ENCOUNTER — APPOINTMENT (OUTPATIENT)
Dept: WOMENS IMAGING | Facility: HOSPITAL | Age: 54
End: 2019-05-22

## 2019-05-22 PROCEDURE — 77063 BREAST TOMOSYNTHESIS BI: CPT | Performed by: RADIOLOGY

## 2019-05-22 PROCEDURE — 77067 SCR MAMMO BI INCL CAD: CPT | Performed by: RADIOLOGY

## 2019-07-08 RX ORDER — LEVOTHYROXINE SODIUM 112 UG/1
TABLET ORAL
Qty: 30 TABLET | Refills: 0 | Status: SHIPPED | OUTPATIENT
Start: 2019-07-08 | End: 2019-09-06 | Stop reason: SDUPTHER

## 2019-07-30 ENCOUNTER — OFFICE VISIT (OUTPATIENT)
Dept: FAMILY MEDICINE CLINIC | Facility: CLINIC | Age: 54
End: 2019-07-30

## 2019-07-30 VITALS
BODY MASS INDEX: 28.3 KG/M2 | DIASTOLIC BLOOD PRESSURE: 78 MMHG | HEIGHT: 62 IN | HEART RATE: 71 BPM | WEIGHT: 153.8 LBS | TEMPERATURE: 98.5 F | OXYGEN SATURATION: 98 % | SYSTOLIC BLOOD PRESSURE: 122 MMHG

## 2019-07-30 DIAGNOSIS — E03.9 HYPOTHYROIDISM, UNSPECIFIED TYPE: ICD-10-CM

## 2019-07-30 DIAGNOSIS — E78.5 HYPERLIPIDEMIA, UNSPECIFIED HYPERLIPIDEMIA TYPE: Primary | ICD-10-CM

## 2019-07-30 DIAGNOSIS — R06.83 SNORING: ICD-10-CM

## 2019-07-30 DIAGNOSIS — R51.9 MORNING HEADACHE: ICD-10-CM

## 2019-07-30 DIAGNOSIS — R73.9 HYPERGLYCEMIA: ICD-10-CM

## 2019-07-30 PROCEDURE — 99214 OFFICE O/P EST MOD 30 MIN: CPT | Performed by: INTERNAL MEDICINE

## 2019-07-30 RX ORDER — DICYCLOMINE HYDROCHLORIDE 10 MG/1
10 CAPSULE ORAL 3 TIMES DAILY PRN
Qty: 30 CAPSULE | Refills: 0 | Status: SHIPPED | OUTPATIENT
Start: 2019-07-30 | End: 2019-09-24

## 2019-07-30 NOTE — PROGRESS NOTES
Subjective   Yomaira Ball is a 53 y.o. female who comes in today for   Chief Complaint   Patient presents with   • Hyperlipidemia   • Hypothyroidism   • Med Refill   .    History of Present Illness   Here for f/u on HL and HT on levoxyl.   Diet controlled with her HL.  Also watches her sugars b/c of hyperglycemia.  Stools alternate between diarrhea which is loose to watery and constipation.  Some cramping along with it.  Had partial colon resection in 2013 due to an abnormal polyp.  cscope in 11/2017 with Dr. Adams and repeat in 5 years.  Stools changed over past year although she states she has had the alternating stools in past as well.  No major dietary change.  No recent antibiotics or colitis or travel.  Is under more stress at this time.  She is also waking up with HA's off and on for 3 years.  MRI 3 years ago was normal other than small vess changes or changes c/w migraines.  Just started having a few more am ha's past few weeks. Was doing well regarding ha's.    The following portions of the patient's history were reviewed and updated as appropriate: allergies, current medications, past family history, past medical history, past social history, past surgical history and problem list.    Review of Systems   Gastrointestinal: Positive for constipation and diarrhea.   Neurological: Positive for headaches.   Psychiatric/Behavioral: Positive for dysphoric mood.        Increased stress b/c caring for brother that recently had a stroke       Vitals:    07/30/19 1625   BP: 122/78   Pulse: 71   Temp: 98.5 °F (36.9 °C)   SpO2: 98%       Objective   Physical Exam   Constitutional: She is oriented to person, place, and time. She appears well-developed and well-nourished.   HENT:   Head: Normocephalic and atraumatic.   Right Ear: External ear normal.   Left Ear: External ear normal.   Mouth/Throat: Oropharynx is clear and moist.   Eyes: Conjunctivae are normal.   Neck: Neck supple.   Cardiovascular: Normal rate, regular  rhythm and normal heart sounds.   No bruits   Pulmonary/Chest: Effort normal and breath sounds normal. No respiratory distress. She has no wheezes. She has no rales.   Abdominal: Soft. Bowel sounds are normal. She exhibits no distension and no mass. There is no tenderness.   Lymphadenopathy:     She has no cervical adenopathy.   Neurological: She is alert and oriented to person, place, and time.   Skin: Skin is warm.   Psychiatric: She has a normal mood and affect. Her behavior is normal. Judgment and thought content normal.   Nursing note and vitals reviewed.        Current Outpatient Medications:   •  levothyroxine (SYNTHROID, LEVOTHROID) 112 MCG tablet, TAKE 1 TABLET BY MOUTH ONCE DAILY, Disp: 30 tablet, Rfl: 0  •  dicyclomine (BENTYL) 10 MG capsule, Take 1 capsule by mouth 3 (Three) Times a Day As Needed (bloating)., Disp: 30 capsule, Rfl: 0    Assessment/Plan   Yomaira was seen today for hyperlipidemia, hypothyroidism and med refill.    Diagnoses and all orders for this visit:    Hyperlipidemia, unspecified hyperlipidemia type  -     T4, Free; Future  -     TSH; Future  -     CBC & Differential; Future  -     Comprehensive Metabolic Panel; Future  -     Hemoglobin A1c; Future  -     T3, Free; Future  -     Lipid Panel With LDL / HDL Ratio; Future    Hypothyroidism, unspecified type  -     T4, Free; Future  -     TSH; Future  -     CBC & Differential; Future  -     Comprehensive Metabolic Panel; Future  -     Hemoglobin A1c; Future  -     T3, Free; Future  -     Lipid Panel With LDL / HDL Ratio; Future    Hyperglycemia  -     T4, Free; Future  -     TSH; Future  -     CBC & Differential; Future  -     Comprehensive Metabolic Panel; Future  -     Hemoglobin A1c; Future  -     T3, Free; Future  -     Lipid Panel With LDL / HDL Ratio; Future    Morning headache  -     Ambulatory Referral to Sleep Medicine  -     T4, Free; Future  -     TSH; Future  -     CBC & Differential; Future  -     Comprehensive Metabolic  Panel; Future  -     Hemoglobin A1c; Future  -     T3, Free; Future  -     Lipid Panel With LDL / HDL Ratio; Future    Snoring  -     Ambulatory Referral to Sleep Medicine  -     T4, Free; Future  -     TSH; Future  -     CBC & Differential; Future  -     Comprehensive Metabolic Panel; Future  -     Hemoglobin A1c; Future  -     T3, Free; Future  -     Lipid Panel With LDL / HDL Ratio; Future    Other orders  -     dicyclomine (BENTYL) 10 MG capsule; Take 1 capsule by mouth 3 (Three) Times a Day As Needed (bloating).    trial of dicyclomine for IBS like sxs.  Start probiotic and consider going GF. If no improvement in stool pattern, rec that she fu with Dr. Adams her GI for further testing/tmt  She declines treatment for stress or stress reaction  Fasting labs ordered  With morning HA's, I would like to get a sleep study to r/o JAIME  She needs to reduce her tea intake and drink more water.  Keep a food dairy and ha journal to see if there are ha triggers.  Think this could be migrainous                 I have asked for the patient to return to clinic in 6month(s).

## 2019-08-02 DIAGNOSIS — E78.5 HYPERLIPIDEMIA, UNSPECIFIED HYPERLIPIDEMIA TYPE: ICD-10-CM

## 2019-08-02 DIAGNOSIS — R51.9 MORNING HEADACHE: ICD-10-CM

## 2019-08-02 DIAGNOSIS — R73.9 HYPERGLYCEMIA: ICD-10-CM

## 2019-08-02 DIAGNOSIS — R06.83 SNORING: ICD-10-CM

## 2019-08-02 DIAGNOSIS — E03.9 HYPOTHYROIDISM, UNSPECIFIED TYPE: ICD-10-CM

## 2019-08-05 ENCOUNTER — LAB (OUTPATIENT)
Dept: FAMILY MEDICINE CLINIC | Facility: CLINIC | Age: 54
End: 2019-08-05

## 2019-08-06 LAB
ALBUMIN SERPL-MCNC: 4.5 G/DL (ref 3.5–5.2)
ALBUMIN/GLOB SERPL: 2 G/DL
ALP SERPL-CCNC: 77 U/L (ref 39–117)
ALT SERPL-CCNC: 37 U/L (ref 1–33)
AST SERPL-CCNC: 20 U/L (ref 1–32)
BASOPHILS # BLD AUTO: 0.06 10*3/MM3 (ref 0–0.2)
BASOPHILS NFR BLD AUTO: 0.9 % (ref 0–1.5)
BILIRUB SERPL-MCNC: 1.2 MG/DL (ref 0.2–1.2)
BUN SERPL-MCNC: 16 MG/DL (ref 6–20)
BUN/CREAT SERPL: 23.9 (ref 7–25)
CALCIUM SERPL-MCNC: 9.2 MG/DL (ref 8.6–10.5)
CHLORIDE SERPL-SCNC: 102 MMOL/L (ref 98–107)
CHOLEST SERPL-MCNC: 159 MG/DL (ref 0–200)
CO2 SERPL-SCNC: 25.7 MMOL/L (ref 22–29)
CREAT SERPL-MCNC: 0.67 MG/DL (ref 0.57–1)
EOSINOPHIL # BLD AUTO: 0.11 10*3/MM3 (ref 0–0.4)
EOSINOPHIL NFR BLD AUTO: 1.7 % (ref 0.3–6.2)
ERYTHROCYTE [DISTWIDTH] IN BLOOD BY AUTOMATED COUNT: 13.3 % (ref 12.3–15.4)
GLOBULIN SER CALC-MCNC: 2.2 GM/DL
GLUCOSE SERPL-MCNC: 106 MG/DL (ref 65–99)
HBA1C MFR BLD: 5.4 % (ref 4.8–5.6)
HCT VFR BLD AUTO: 49.5 % (ref 34–46.6)
HDLC SERPL-MCNC: 38 MG/DL (ref 40–60)
HGB BLD-MCNC: 15.4 G/DL (ref 12–15.9)
IMM GRANULOCYTES # BLD AUTO: 0.03 10*3/MM3 (ref 0–0.05)
IMM GRANULOCYTES NFR BLD AUTO: 0.5 % (ref 0–0.5)
LDLC SERPL CALC-MCNC: 97 MG/DL (ref 0–100)
LDLC/HDLC SERPL: 2.54 {RATIO}
LYMPHOCYTES # BLD AUTO: 1.94 10*3/MM3 (ref 0.7–3.1)
LYMPHOCYTES NFR BLD AUTO: 29.7 % (ref 19.6–45.3)
MCH RBC QN AUTO: 29 PG (ref 26.6–33)
MCHC RBC AUTO-ENTMCNC: 31.1 G/DL (ref 31.5–35.7)
MCV RBC AUTO: 93.2 FL (ref 79–97)
MONOCYTES # BLD AUTO: 0.66 10*3/MM3 (ref 0.1–0.9)
MONOCYTES NFR BLD AUTO: 10.1 % (ref 5–12)
NEUTROPHILS # BLD AUTO: 3.73 10*3/MM3 (ref 1.7–7)
NEUTROPHILS NFR BLD AUTO: 57.1 % (ref 42.7–76)
NRBC BLD AUTO-RTO: 0 /100 WBC (ref 0–0.2)
PLATELET # BLD AUTO: 254 10*3/MM3 (ref 140–450)
POTASSIUM SERPL-SCNC: 4.5 MMOL/L (ref 3.5–5.2)
PROT SERPL-MCNC: 6.7 G/DL (ref 6–8.5)
RBC # BLD AUTO: 5.31 10*6/MM3 (ref 3.77–5.28)
SODIUM SERPL-SCNC: 140 MMOL/L (ref 136–145)
T3FREE SERPL-MCNC: 3 PG/ML (ref 2–4.4)
T4 FREE SERPL-MCNC: 1.25 NG/DL (ref 0.93–1.7)
TRIGL SERPL-MCNC: 122 MG/DL (ref 0–150)
TSH SERPL DL<=0.005 MIU/L-ACNC: 1.45 MIU/ML (ref 0.27–4.2)
VLDLC SERPL CALC-MCNC: 24.4 MG/DL
WBC # BLD AUTO: 6.53 10*3/MM3 (ref 3.4–10.8)

## 2019-08-07 DIAGNOSIS — R71.8 HIGH HEMATOCRIT: Primary | ICD-10-CM

## 2019-08-07 LAB
FERRITIN SERPL-MCNC: 226 NG/ML (ref 13–150)
IRON SERPL-MCNC: 76 MCG/DL (ref 37–145)
Lab: NORMAL
WRITTEN AUTHORIZATION: NORMAL

## 2019-08-08 ENCOUNTER — OFFICE VISIT (OUTPATIENT)
Dept: FAMILY MEDICINE CLINIC | Facility: CLINIC | Age: 54
End: 2019-08-08

## 2019-08-08 VITALS
HEART RATE: 66 BPM | WEIGHT: 155 LBS | OXYGEN SATURATION: 99 % | RESPIRATION RATE: 18 BRPM | HEIGHT: 62 IN | BODY MASS INDEX: 28.52 KG/M2 | DIASTOLIC BLOOD PRESSURE: 84 MMHG | SYSTOLIC BLOOD PRESSURE: 140 MMHG | TEMPERATURE: 98 F

## 2019-08-08 DIAGNOSIS — J30.9 ALLERGIC RHINITIS, UNSPECIFIED SEASONALITY, UNSPECIFIED TRIGGER: Primary | ICD-10-CM

## 2019-08-08 DIAGNOSIS — E83.110 COMPOUND HETEROZYGOUS HEMOCHROMATOSIS TYPE 1 (HCC): ICD-10-CM

## 2019-08-08 DIAGNOSIS — R71.8 ELEVATED HEMATOCRIT: Primary | ICD-10-CM

## 2019-08-08 PROCEDURE — 99213 OFFICE O/P EST LOW 20 MIN: CPT | Performed by: NURSE PRACTITIONER

## 2019-08-08 RX ORDER — FLUTICASONE PROPIONATE 50 MCG
2 SPRAY, SUSPENSION (ML) NASAL DAILY
Qty: 1 BOTTLE | Refills: 3 | Status: SHIPPED | OUTPATIENT
Start: 2019-08-08 | End: 2021-02-16 | Stop reason: HOSPADM

## 2019-08-08 RX ORDER — CETIRIZINE HYDROCHLORIDE 10 MG/1
10 TABLET ORAL DAILY
COMMUNITY
End: 2019-10-23

## 2019-08-08 RX ORDER — LOTEPREDNOL ETABONATE AND TOBRAMYCIN 5; 3 MG/ML; MG/ML
SUSPENSION/ DROPS OPHTHALMIC
COMMUNITY
Start: 2019-08-06 | End: 2019-09-24

## 2019-08-08 NOTE — PROGRESS NOTES
Subjective   Yomaira Ball is a 53 y.o. female.     Chief Complaint   Patient presents with   • Sinusitis   • Earache      HPI patient is new to me.  She is here for approximately 1 week of congestion, plugged up years, sore throat.  This began with 2 headaches last week which resolved with OTC medications.  This past Tuesday she went to the eye doctor for extremely itchy eyes and was given medication and followed up there today and was told she should come to the office to make sure she does not have a sinus infection.  Her ears seem to be feeling a little bit better today and her itching eyes are improved with her medication.  She does not feel badly.  She has had sinus infections in the past and was concerned that she may be getting sinusitis.      She previously previously did immunotherapy for significant allergies but eventually stopped due to cost and time commitment.  She does not remember what environmental thinks she is allergic to.    Social History     Tobacco Use   • Smoking status: Never Smoker   • Smokeless tobacco: Never Used   Substance Use Topics   • Alcohol use: Yes     Comment: Socially   • Drug use: No       The following portions of the patient's history were reviewed and updated as appropriate: allergies, current medications, past family history, past medical history, past social history, past surgical history and problem list.    Review of Systems   Constitutional: Positive for fatigue (X3 days). Negative for appetite change, chills and fever.   HENT: Positive for congestion, postnasal drip, sinus pressure, sore throat and voice change (Intermittent hoarseness over the last week). Negative for ear discharge, ear pain (Just feel full bilaterally), facial swelling, rhinorrhea, sinus pain and trouble swallowing.    Eyes: Positive for itching. Negative for discharge.   Respiratory: Negative for cough, shortness of breath and wheezing.    Cardiovascular: Negative for chest pain and palpitations.  "  Gastrointestinal: Negative for abdominal pain, diarrhea, nausea and vomiting.   Skin: Negative for rash.   Allergic/Immunologic: Positive for environmental allergies.   Neurological: Positive for dizziness (She has a little bit of dizziness intermittently yesterday which is now resolved). Negative for weakness.   Hematological: Negative for adenopathy.       Objective   Blood pressure 140/84, pulse 66, temperature 98 °F (36.7 °C), resp. rate 18, height 156.2 cm (61.5\"), weight 70.3 kg (155 lb), SpO2 99 %.    Physical Exam   Constitutional: She is oriented to person, place, and time. She appears well-developed and well-nourished. No distress.   HENT:   Head: Normocephalic and atraumatic.   Right Ear: External ear and ear canal normal. Tympanic membrane is bulging. Tympanic membrane is not erythematous. A middle ear effusion is present.   Left Ear: External ear and ear canal normal. Tympanic membrane is not erythematous. A middle ear effusion is present.   Nose: Mucosal edema present. Right sinus exhibits no maxillary sinus tenderness and no frontal sinus tenderness. Left sinus exhibits no maxillary sinus tenderness and no frontal sinus tenderness.   Mouth/Throat: Posterior oropharyngeal erythema present.   Eyes: Conjunctivae are normal. Right eye exhibits no discharge. Left eye exhibits no discharge.   Neck: Neck supple.   Cardiovascular: Normal rate, regular rhythm and normal heart sounds.   Pulmonary/Chest: Effort normal and breath sounds normal.   Abdominal: Soft. Bowel sounds are normal. There is no tenderness.   Musculoskeletal: She exhibits no deformity.   Gait smooth and steady   Lymphadenopathy:     She has no cervical adenopathy.   Neurological: She is alert and oriented to person, place, and time.   Skin: Skin is warm and dry. She is not diaphoretic.   Psychiatric: She has a normal mood and affect.   Nursing note and vitals reviewed.      Assessment   Problem List Items Addressed This Visit     None    "   Visit Diagnoses     Allergic rhinitis, unspecified seasonality, unspecified trigger    -  Primary    Relevant Medications    cetirizine (zyrTEC) 10 MG tablet    fluticasone (FLONASE) 50 MCG/ACT nasal spray           Procedures           Impression and Plan: Most likely exacerbation of chronic allergic rhinitis.  She seems to be improving.  At this point think she needs an antibiotic.  We will add flonase OTC up to BID x 1 week, then daily.  I have demonstrated use of Flonase.  We discussed signs and symptoms that would require a follow-up appointment or phone call.  We also discussed management of chronic allergic rhinitis.  Discussed with patient that recently molds and ragweed have increased which is early this year.      There are no preventive care reminders to display for this patient.           EMR Dragon/Transcription disclaimer:   Much of this encounter note is an electronic transcription/translation of spoken language to printed text. The electronic translation of spoken language may permit erroneous, or at times, nonsensical words or phrases to be inadvertently transcribed; Although I have reviewed the note for such errors, some may still exist.

## 2019-08-12 ENCOUNTER — RESULTS ENCOUNTER (OUTPATIENT)
Dept: FAMILY MEDICINE CLINIC | Facility: CLINIC | Age: 54
End: 2019-08-12

## 2019-08-12 DIAGNOSIS — R71.8 HIGH HEMATOCRIT: ICD-10-CM

## 2019-08-14 ENCOUNTER — OFFICE VISIT (OUTPATIENT)
Dept: SLEEP MEDICINE | Facility: HOSPITAL | Age: 54
End: 2019-08-14

## 2019-08-14 VITALS
WEIGHT: 154 LBS | HEART RATE: 87 BPM | OXYGEN SATURATION: 97 % | BODY MASS INDEX: 28.34 KG/M2 | HEIGHT: 62 IN | SYSTOLIC BLOOD PRESSURE: 129 MMHG | DIASTOLIC BLOOD PRESSURE: 68 MMHG

## 2019-08-14 DIAGNOSIS — R29.818 SUSPECTED SLEEP APNEA: Primary | ICD-10-CM

## 2019-08-14 DIAGNOSIS — R06.83 SNORING: ICD-10-CM

## 2019-08-14 DIAGNOSIS — R40.0 DAYTIME SLEEPINESS: ICD-10-CM

## 2019-08-14 DIAGNOSIS — R51.9 MORNING HEADACHE: ICD-10-CM

## 2019-08-14 PROCEDURE — G0463 HOSPITAL OUTPT CLINIC VISIT: HCPCS

## 2019-08-14 NOTE — PROGRESS NOTES
"[facility]  Telephone: [phone] / Fax: [phone] [place]  Telephone: [phone] / Fax: [phone] Mellissa Morrison    Referring Physician: Ellyn Martinez MD  PCP: Ellyn Martinez MD    Reason for consult:  sleep apnea    Yomaira Ball is a 53 y.o.female  was seen in the Sleep Disorders Center today for evaluation of sleep apnea. She reports morning headaches that ongoing for 3 years that are severe . She had normal MRI in 2016. She is here today for JAIME evaluation in view of morning headaches. She reports only occasional snoring but denies witnessed apneas.  Her fitbit data demonstrates a lot of arousals. She has not had a sleep study in the past. Her sleep schedule is 11pm- 6:30am. She wakes up tired in the morning.    SH- , no alcohol, 3 coffee per day.    ROS- +nasal congestion, +post nasal drip, +cough, rest is negative.    Yomaira Ball  has a past medical history of Hypothyroidism.    Current Medications:    Current Outpatient Medications:   •  cetirizine (zyrTEC) 10 MG tablet, Take 10 mg by mouth Daily., Disp: , Rfl:   •  dicyclomine (BENTYL) 10 MG capsule, Take 1 capsule by mouth 3 (Three) Times a Day As Needed (bloating)., Disp: 30 capsule, Rfl: 0  •  fluticasone (FLONASE) 50 MCG/ACT nasal spray, 2 sprays into the nostril(s) as directed by provider Daily., Disp: 1 bottle, Rfl: 3  •  levothyroxine (SYNTHROID, LEVOTHROID) 112 MCG tablet, TAKE 1 TABLET BY MOUTH ONCE DAILY, Disp: 30 tablet, Rfl: 0  •  ZYLET 0.5-0.3 % suspension ophthalmic suspension, , Disp: , Rfl:     I have reviewed Past Medical History, Past Surgical History, Medication List, Social History and Family History as entered in Sleep Questionnaire and EPIC.    ESS  11   Vital Signs /68   Pulse 87   Ht 156.2 cm (61.5\")   Wt 69.9 kg (154 lb)   LMP  (LMP Unknown)   SpO2 97%   BMI 28.63 kg/m²  Body mass index is 28.63 kg/m².    General Alert and oriented. No acute distress " noted   Pharynx/Throat Class II Mallampati airway, large tongue, no evidence of redundant lateral pharyngeal tissue. No oral lesions. No thrush. Moist mucous membranes.   Head Normocephalic. Symmetrical. Atraumatic.    Nose No septal deviation. No drainage   Chest Wall Normal shape. Symmetric expansion with respiration. No tenderness.   Neck Trachea midline, no thyromegaly or adenopathy    Lungs Clear to auscultation bilaterally. No wheezes. No rhonchi. No rales. Respirations regular, even and unlabored.   Heart Regular rhythm and normal rate. Normal S1 and S2. No murmur   Abdomen Soft, non-tender and non-distended. Normal bowel sounds. No masses.   Extremities Moves all extremities well. No edema   Psychiatric Normal mood and affect.           Impression:  1. Suspected sleep apnea    2. Morning headache    3. Snoring    4. Daytime sleepiness          Plan:  I discussed the pathophysiology of obstructive sleep apnea with the patient.  We discussed the adverse outcomes associated with untreated sleep-disordered breathing.  We discussed treatment modalities of obstructive sleep apnea including CPAP device as well as oral mandibular advancement device. Sleep study will be scheduled to establish definitive diagnosis of sleep disorder breathing.  Weight loss will be strongly beneficial in order to reduce the severity of sleep-disordered breathing.  Caution during activities that require prolonged concentration is strongly advised.  Patient will be notified of sleep study results after sleep study is completed.  If sleep apnea is only mild,  oral mandibular advancement device may be one of the treatment options.  However if sleep apnea is moderately severe, CPAP treatment will be strongly encouraged.  The patient is not opposed to treatment with CPAP device if we confirm significant obstructive sleep apnea on polysomnography.      Thank you for allowing me to participate in your patient's care.    The patient will follow  up with Dr. Beard after completion of HST.    NENO Waddell  [facility]  Phone: [phone]      Part of this note may be an electronic transcription/translation of spoken language to printed text using the Dragon Dictation System. Some errors may exist even though the document was edited.

## 2019-08-16 ENCOUNTER — OFFICE VISIT (OUTPATIENT)
Dept: FAMILY MEDICINE CLINIC | Facility: CLINIC | Age: 54
End: 2019-08-16

## 2019-08-16 VITALS
WEIGHT: 153 LBS | SYSTOLIC BLOOD PRESSURE: 130 MMHG | DIASTOLIC BLOOD PRESSURE: 72 MMHG | RESPIRATION RATE: 20 BRPM | OXYGEN SATURATION: 98 % | BODY MASS INDEX: 28.16 KG/M2 | TEMPERATURE: 98.6 F | HEIGHT: 62 IN | HEART RATE: 68 BPM

## 2019-08-16 DIAGNOSIS — J01.00 ACUTE NON-RECURRENT MAXILLARY SINUSITIS: Primary | ICD-10-CM

## 2019-08-16 PROCEDURE — 99214 OFFICE O/P EST MOD 30 MIN: CPT | Performed by: NURSE PRACTITIONER

## 2019-08-16 RX ORDER — GUAIFENESIN 600 MG/1
1200 TABLET, EXTENDED RELEASE ORAL 2 TIMES DAILY
COMMUNITY
End: 2019-09-24

## 2019-08-16 RX ORDER — DOXYCYCLINE HYCLATE 100 MG/1
100 CAPSULE ORAL 2 TIMES DAILY
Qty: 14 CAPSULE | Refills: 0 | Status: SHIPPED | OUTPATIENT
Start: 2019-08-16 | End: 2019-08-23

## 2019-08-16 NOTE — PROGRESS NOTES
"Subjective   Yomaira Ball is a 53 y.o. female.     Chief Complaint   Patient presents with   • Sinusitis   • Allergies      HPI patient returns today for worsening of her symptoms.  She has been using the Flonase twice per day as well as the Zyrtec daily without improvement in her symptoms.  She has added Mucinex which has not seemed very helpful.  Today she is complaining of cough which is moist.  She has postnasal drip which is worse when sleeping.  She has sinus fullness and her ears feel full and mildly tender.  Fall is bad season for allergies.  She is going on vacation at the end of next week and is worried about being sick.    Social History     Tobacco Use   • Smoking status: Never Smoker   • Smokeless tobacco: Never Used   Substance Use Topics   • Alcohol use: Yes     Comment: Socially   • Drug use: No       The following portions of the patient's history were reviewed and updated as appropriate: allergies, current medications, past family history, past medical history, past social history, past surgical history and problem list.    Review of Systems   Constitutional: Positive for fatigue. Negative for chills and fever.   HENT: Positive for congestion, ear pain, postnasal drip, sinus pressure and sore throat (Feels scratchy and raw). Negative for ear discharge and rhinorrhea.    Eyes: Negative for discharge and itching.   Respiratory: Positive for cough. Negative for shortness of breath and wheezing.    Cardiovascular: Negative for chest pain and palpitations.   Gastrointestinal: Negative for abdominal pain, diarrhea, nausea and vomiting.   Allergic/Immunologic: Positive for environmental allergies.   Neurological: Positive for headaches. Negative for dizziness and weakness.   Hematological: Negative for adenopathy.       Objective   Blood pressure 130/72, pulse 68, temperature 98.6 °F (37 °C), resp. rate 20, height 156.2 cm (61.5\"), weight 69.4 kg (153 lb), SpO2 98 %.    Physical Exam   Constitutional: " She is oriented to person, place, and time. She appears well-developed and well-nourished. No distress.   Does appear to be feeling poorly, is nontoxic   HENT:   Head: Normocephalic and atraumatic.   Right Ear: External ear and ear canal normal. There is tenderness. Tympanic membrane is not erythematous. A middle ear effusion is present.   Left Ear: External ear and ear canal normal. No tenderness. Tympanic membrane is not erythematous. A middle ear effusion is present.   Nose: Mucosal edema present. Right sinus exhibits maxillary sinus tenderness. Right sinus exhibits no frontal sinus tenderness. Left sinus exhibits maxillary sinus tenderness. Left sinus exhibits no frontal sinus tenderness.   Mouth/Throat: Posterior oropharyngeal erythema present. No tonsillar exudate.   Eyes: Conjunctivae are normal. Right eye exhibits no discharge. Left eye exhibits no discharge.   Cardiovascular: Normal rate, regular rhythm and normal heart sounds.   Pulmonary/Chest: Effort normal and breath sounds normal.   Abdominal: Soft. Bowel sounds are normal. There is no tenderness.   Musculoskeletal: She exhibits no deformity.   Gait smooth and steady   Neurological: She is alert and oriented to person, place, and time.   Skin: Skin is warm and dry. She is not diaphoretic.   Psychiatric: She has a normal mood and affect.   Nursing note and vitals reviewed.      Assessment   Problem List Items Addressed This Visit     None      Visit Diagnoses     Acute non-recurrent maxillary sinusitis    -  Primary    Relevant Medications    doxycycline (VIBRAMYCIN) 100 MG capsule           Procedures           Impression and Plan: She seems to be worsened from last visit.  I will add doxycycline for 1 week.   Sun sensitivity discussed.  I am leery to have her Augmentin or amoxicillin due to her Ceftin ear allergy.  She does not know if she has had a reaction to either Augmentin or amoxicillin but does report that Ceftin ear gives her hives.  Continue  mucinex, flonase, antihistamine.  If she needs a cough suppressant I recommended Dimetapp OTC.  If she is not improved prior to going on vacation I have asked her to let me know.      There are no preventive care reminders to display for this patient.           EMR Dragon/Transcription disclaimer:   Much of this encounter note is an electronic transcription/translation of spoken language to printed text. The electronic translation of spoken language may permit erroneous, or at times, nonsensical words or phrases to be inadvertently transcribed; Although I have reviewed the note for such errors, some may still exist.

## 2019-08-19 RX ORDER — LEVOTHYROXINE SODIUM 112 UG/1
TABLET ORAL
Qty: 90 TABLET | Refills: 0 | Status: SHIPPED | OUTPATIENT
Start: 2019-08-19 | End: 2019-11-25 | Stop reason: SDUPTHER

## 2019-09-06 ENCOUNTER — OFFICE VISIT (OUTPATIENT)
Dept: FAMILY MEDICINE CLINIC | Facility: CLINIC | Age: 54
End: 2019-09-06

## 2019-09-06 VITALS
HEIGHT: 62 IN | SYSTOLIC BLOOD PRESSURE: 124 MMHG | OXYGEN SATURATION: 98 % | BODY MASS INDEX: 28.89 KG/M2 | DIASTOLIC BLOOD PRESSURE: 72 MMHG | WEIGHT: 157 LBS | HEART RATE: 80 BPM | TEMPERATURE: 98.2 F | RESPIRATION RATE: 16 BRPM

## 2019-09-06 DIAGNOSIS — J02.9 SORE THROAT: Primary | ICD-10-CM

## 2019-09-06 DIAGNOSIS — J30.2 SEASONAL ALLERGIC RHINITIS, UNSPECIFIED TRIGGER: ICD-10-CM

## 2019-09-06 LAB
EXPIRATION DATE: NORMAL
INTERNAL CONTROL: NORMAL
Lab: NORMAL
S PYO AG THROAT QL: NEGATIVE

## 2019-09-06 PROCEDURE — 99213 OFFICE O/P EST LOW 20 MIN: CPT | Performed by: NURSE PRACTITIONER

## 2019-09-06 PROCEDURE — 87880 STREP A ASSAY W/OPTIC: CPT | Performed by: NURSE PRACTITIONER

## 2019-09-06 RX ORDER — AMOXICILLIN 875 MG/1
875 TABLET, COATED ORAL EVERY 12 HOURS SCHEDULED
Qty: 20 TABLET | Refills: 0 | Status: SHIPPED | OUTPATIENT
Start: 2019-09-06 | End: 2019-09-24

## 2019-09-06 RX ORDER — AZELASTINE 1 MG/ML
2 SPRAY, METERED NASAL 2 TIMES DAILY
Qty: 30 ML | Refills: 1 | Status: SHIPPED | OUTPATIENT
Start: 2019-09-06 | End: 2020-07-17

## 2019-09-06 NOTE — PROGRESS NOTES
Subjective   Yomaira Ball is a 53 y.o. female presents with recent conjunctivitis, was treated as allergic initially, then treated for allergies in office, with flonase/zyrtec. Green mucous, treated with doxycycline. Afterwards, went on vacation, symptoms improved. Upon flight home, felt increased sinus pressure.     Now with sore throat, uvula red and spots on it. No known fever or chills.     Sore Throat    This is a new problem. The current episode started yesterday. The problem has been unchanged. There has been no fever. The pain is at a severity of 4/10. The pain is moderate. Associated symptoms include congestion. Pertinent negatives include no abdominal pain, coughing, diarrhea, drooling, ear discharge, ear pain, headaches, hoarse voice, plugged ear sensation, neck pain, shortness of breath, stridor, swollen glands, trouble swallowing or vomiting. She has had no exposure to strep or mono. She has tried cool liquids and NSAIDs for the symptoms. The treatment provided moderate relief.        The following portions of the patient's history were reviewed and updated as appropriate: allergies, current medications, past family history, past medical history, past social history, past surgical history and problem list.    Review of Systems   Constitutional: Negative for appetite change, fatigue and fever.   HENT: Positive for congestion and sore throat. Negative for drooling, ear discharge, ear pain, hoarse voice and trouble swallowing.    Eyes: Positive for discharge (mostly clear, occasionally purulent). Negative for pain, redness and itching.   Respiratory: Negative for cough, shortness of breath and stridor.    Gastrointestinal: Negative for abdominal pain, diarrhea and vomiting.   Musculoskeletal: Negative for neck pain.   Neurological: Negative for headaches.       Objective   Physical Exam   Constitutional: She is oriented to person, place, and time. She appears well-developed and well-nourished.   HENT:    Head: Normocephalic and atraumatic.   Right Ear: Tympanic membrane and ear canal normal.   Left Ear: Tympanic membrane and ear canal normal.   Nose: Right sinus exhibits frontal sinus tenderness. Left sinus exhibits frontal sinus tenderness.   Mouth/Throat: Uvula is midline and mucous membranes are normal. No uvula swelling (erythematous). Posterior oropharyngeal erythema present.   Neck: Neck supple.   Cardiovascular: Normal rate, regular rhythm and normal heart sounds. Exam reveals no gallop and no friction rub.   No murmur heard.  Pulmonary/Chest: Effort normal and breath sounds normal. No stridor. No respiratory distress. She has no wheezes. She has no rales.   Lymphadenopathy:     She has no cervical adenopathy.   Neurological: She is alert and oriented to person, place, and time.   Skin: Skin is warm and dry.   Psychiatric: She has a normal mood and affect.       Assessment/Plan   Yomaira was seen today for sore throat.    Diagnoses and all orders for this visit:    Sore throat  -     POC Rapid Strep A    Seasonal allergic rhinitis, unspecified trigger    wait and see antibiotic given  Patient to continue flonase and zyrtec, add astelin spray, recommend twice daily dosing for both astelin and flonase

## 2019-09-23 NOTE — PROGRESS NOTES
.     REASON FOR CONSULTATION:    heterozygote for hemochromatosis gene mutation  Provide an opinion on any further workup or treatment                             REQUESTING PHYSICIAN: Ellyn Martinez MD  RECORDS OBTAINED:  Records of the patients history including those obtained from the referring provider were reviewed and summarized in detail.    HISTORY OF PRESENT ILLNESS:  The patient is a 53 y.o. year old female  who is here for follow-up with the above-mentioned history.    She was last seen by Dr. Cano of our practice 8/25/2015.  She states her PCP asked her to return due to an elevated hematocrit.    Denies bleeding, orthopnea, itching after hot showers, strokelike symptoms, unusual headaches, unusual vision changes, chest pain, shortness of breath, swelling of legs.    She last saw her PCP, Dr. Martinez, on 7/31/2019.  She saw her for hyperlipidemia and hypothyroidism.      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 Use in each nostril as directed, Disp: 30 mL, Rfl: 1  •  BEPREVE 1.5 % solution, INSTILL 1 DROP INTO BOTH EYES 3 TIMES A DAY, Disp: , Rfl: 1  •  cetirizine (zyrTEC) 10 MG tablet, Take 10 mg by mouth Daily., Disp: ,  Rfl:   •  fluticasone (FLONASE) 50 MCG/ACT nasal spray, 2 sprays into the nostril(s) as directed by provider Daily., Disp: 1 bottle, Rfl: 3  •  levothyroxine (SYNTHROID, LEVOTHROID) 112 MCG tablet, TAKE 1 TABLET BY MOUTH ONCE DAILY, Disp: 90 tablet, Rfl: 0    ALLERGIES:     Allergies   Allergen Reactions   • Cefdinir        SOCIAL HISTORY:       Social History     Socioeconomic History   • Marital status:      Spouse name: Rogerio   • Number of children: 3   • Years of education: Not on file   • Highest education level: Not on file   Occupational History   • Occupation:      Employer: [employer]   Tobacco Use   • Smoking status: Never Smoker   • Smokeless tobacco: Never Used   Substance and Sexual Activity   • Alcohol use: Yes     Comment: Socially   • Drug use: No   • Sexual activity: Defer         FAMILY HISTORY:  Family History   Problem Relation Age of Onset   • Diabetes Mother    • Hypertension Mother    • Heart disease Father    • Hypertension Father    • Diabetes Sister    • Hypertension Sister    • Diabetes Brother    • Hypertension Brother    • Breast cancer Maternal Aunt         Diagnosed in her 50s       REVIEW OF SYSTEMS:  Review of Systems   Constitutional: Negative for activity change.   HENT: Negative for nosebleeds and trouble swallowing.    Respiratory: Negative for shortness of breath and wheezing.    Cardiovascular: Negative for chest pain and palpitations.   Gastrointestinal: Negative for constipation, diarrhea and nausea.   Genitourinary: Negative for dysuria and hematuria.   Musculoskeletal: Negative for arthralgias and myalgias.   Skin: Negative for rash and wound.   Neurological: Negative for seizures and syncope.   Hematological: Negative for adenopathy. Does not bruise/bleed easily.   Psychiatric/Behavioral: Negative for confusion.              Vitals:    09/24/19 1515   BP: 131/80   Pulse: 70   Resp: 16   Temp: 98 °F (36.7 °C)  "  TempSrc: Oral   SpO2: 99%   Weight: 70.4 kg (155 lb 4.8 oz)   Height: 156.2 cm (61.5\")   PainSc: 0-No pain     Current Status 9/24/2019   ECOG score 0      PHYSICAL EXAM:    CONSTITUTIONAL:  Vital signs reviewed.  No distress, looks comfortable.  EYES:  Conjunctiva and lids unremarkable.  PERRLA  EARS,NOSE,MOUTH,THROAT:  Ears and nose appear unremarkable.  Lips, teeth, gums appear unremarkable.  RESPIRATORY:  Normal respiratory effort.  Lungs clear to auscultation bilaterally.  CARDIOVASCULAR:  Normal S1, S2.  No murmurs rubs or gallops.  No significant lower extremity edema.  GASTROINTESTINAL: Abdomen appears unremarkable.  Nontender.  No hepatomegaly.  No splenomegaly.  LYMPHATIC:  No cervical, supraclavicular, axillary lymphadenopathy.  SKIN:  Warm.  No rashes.  PSYCHIATRIC:  Normal judgment and insight.  Normal mood and affect.      RECENT LABS:        WBC   Date Value Ref Range Status   09/24/2019 6.03 3.40 - 10.80 10*3/mm3 Final   08/05/2019 6.53 3.40 - 10.80 10*3/mm3 Final   09/17/2018 8.25 4.50 - 10.70 10*3/mm3 Final   08/31/2018 6.18 4.50 - 10.70 10*3/mm3 Final   01/04/2018 6.78 4.50 - 10.70 10*3/mm3 Final   11/29/2016 5.48 4.50 - 10.70 10*3/mm3 Final   06/24/2015 5.56 4.50 - 10.70 K/Cumm Final   06/01/2015 6.4 3.4 - 10.8 x10E3/uL Final   05/13/2015 14.04 (H) 4.50 - 10.70 K/Cumm Final   05/04/2015 6.95 4.50 - 10.70 K/Cumm Final     Hemoglobin   Date Value Ref Range Status   09/24/2019 15.1 12.0 - 15.9 g/dL Final   08/05/2019 15.4 12.0 - 15.9 g/dL Final   09/17/2018 15.0 11.9 - 15.5 g/dL Final   08/31/2018 15.6 (H) 11.9 - 15.5 g/dL Final   01/04/2018 15.7 (H) 11.9 - 15.5 g/dL Final   11/29/2016 15.4 11.9 - 15.5 g/dL Final   06/24/2015 15.4 11.9 - 15.5 g/dL Final   06/01/2015 14.6 11.1 - 15.9 g/dL Final   05/13/2015 14.6 11.9 - 15.5 g/dL Final   05/04/2015 15.6 (H) 11.9 - 15.5 g/dL Final     Platelets   Date Value Ref Range Status   09/24/2019 241 140 - 450 10*3/mm3 Final   08/05/2019 254 140 - 450 10*3/mm3 " Final   09/17/2018 208 140 - 500 10*3/mm3 Final   08/31/2018 225 140 - 500 10*3/mm3 Final   01/04/2018 282 140 - 500 10*3/mm3 Final   11/29/2016 261 140 - 500 10*3/mm3 Final   06/24/2015 275 140 - 500 K/Cumm Final   06/01/2015 332 150 - 379 x10E3/uL Final   05/13/2015 278 140 - 500 K/Cumm Final   05/04/2015 281 140 - 500 K/Cumm Final       Assessment/Plan   Polycythemia  - Ferritin  - Iron Profile  - JAK2 Exon 12 Mutation Analysis      *Hemochromatosis C282Y heterozygous gene mutation  · Oftentimes, even homozygote C282Y gene mutation patients do not develop the clinical problem of hemochromatosis due to low penetrance.  It would be significantly more unusual for a C282Y heterozygote to develop the clinical problem of hemochromatosis.  · Clinical hemochromatosis typically has iron saturation >50%  · On 7/17/2015, while ferritin 159, only 17% saturation.  · Today, 9/24/2019, only 16% saturation with ferritin 206.  · I suspect her mild elevation of ferritin is due to fatty liver.  I do not think she needs any further evaluation or treatment for hemochromatosis.  I see no signs of clinical hemochromatosis.    *Elevated ferritin.  I suspect this is due to liver disease.  Patient states she has fatty liver.  She frequently has mildly elevated transaminases.    *Secondary polycythemia  · On 9/18/2015, KILO 2 V617F and BCR ABL negative  · On 9/18/2015, CT abdomen and pelvis unremarkable (done looking for possible malignancy as the cause of polycythemia)  · Today, 9/24/2019, we will check a Kilo 2 exon 12 mutation.    *Suspected sleep apnea.  Was seen by pulmonary 8/14/2019 with plans for sleep study.    Plan  · JAK2 exon 12 mutation  · MD in a couple of weeks.  At that visit, we will review ferritin and iron saturation from today which were pending at the time she left the office as well as the exon 12 results    Peripheral smear was personally reviewed by me and looks unremarkable.  Records reviewed and summarized.

## 2019-09-24 ENCOUNTER — LAB (OUTPATIENT)
Dept: OTHER | Facility: HOSPITAL | Age: 54
End: 2019-09-24

## 2019-09-24 ENCOUNTER — CONSULT (OUTPATIENT)
Dept: ONCOLOGY | Facility: CLINIC | Age: 54
End: 2019-09-24

## 2019-09-24 VITALS
TEMPERATURE: 98 F | HEIGHT: 61 IN | WEIGHT: 155.3 LBS | OXYGEN SATURATION: 99 % | DIASTOLIC BLOOD PRESSURE: 80 MMHG | SYSTOLIC BLOOD PRESSURE: 131 MMHG | HEART RATE: 70 BPM | BODY MASS INDEX: 29.32 KG/M2 | RESPIRATION RATE: 16 BRPM

## 2019-09-24 DIAGNOSIS — E83.110 COMPOUND HETEROZYGOUS HEMOCHROMATOSIS TYPE 1 (HCC): Primary | ICD-10-CM

## 2019-09-24 DIAGNOSIS — D75.1 POLYCYTHEMIA: ICD-10-CM

## 2019-09-24 DIAGNOSIS — D75.1 POLYCYTHEMIA: Primary | ICD-10-CM

## 2019-09-24 LAB
BASOPHILS # BLD AUTO: 0.08 10*3/MM3 (ref 0–0.2)
BASOPHILS NFR BLD AUTO: 1.3 % (ref 0–1.5)
DEPRECATED RDW RBC AUTO: 41.1 FL (ref 37–54)
EOSINOPHIL # BLD AUTO: 0.13 10*3/MM3 (ref 0–0.4)
EOSINOPHIL NFR BLD AUTO: 2.2 % (ref 0.3–6.2)
ERYTHROCYTE [DISTWIDTH] IN BLOOD BY AUTOMATED COUNT: 12.7 % (ref 12.3–15.4)
FERRITIN SERPL-MCNC: 205.5 NG/ML (ref 13–150)
HCT VFR BLD AUTO: 44.7 % (ref 34–46.6)
HGB BLD-MCNC: 15.1 G/DL (ref 12–15.9)
HOLD SPECIMEN: NORMAL
IMM GRANULOCYTES # BLD AUTO: 0.01 10*3/MM3 (ref 0–0.05)
IMM GRANULOCYTES NFR BLD AUTO: 0.2 % (ref 0–0.5)
IRON 24H UR-MRATE: 68 MCG/DL (ref 37–145)
IRON SATN MFR SERPL: 16 % (ref 20–50)
LYMPHOCYTES # BLD AUTO: 2.18 10*3/MM3 (ref 0.7–3.1)
LYMPHOCYTES NFR BLD AUTO: 36.2 % (ref 19.6–45.3)
MCH RBC QN AUTO: 29.8 PG (ref 26.6–33)
MCHC RBC AUTO-ENTMCNC: 33.8 G/DL (ref 31.5–35.7)
MCV RBC AUTO: 88.2 FL (ref 79–97)
MONOCYTES # BLD AUTO: 0.54 10*3/MM3 (ref 0.1–0.9)
MONOCYTES NFR BLD AUTO: 9 % (ref 5–12)
NEUTROPHILS # BLD AUTO: 3.09 10*3/MM3 (ref 1.7–7)
NEUTROPHILS NFR BLD AUTO: 51.1 % (ref 42.7–76)
NRBC BLD AUTO-RTO: 0 /100 WBC (ref 0–0.2)
PLATELET # BLD AUTO: 241 10*3/MM3 (ref 140–450)
PMV BLD AUTO: 11.1 FL (ref 6–12)
RBC # BLD AUTO: 5.07 10*6/MM3 (ref 3.77–5.28)
TIBC SERPL-MCNC: 423 MCG/DL (ref 298–536)
TRANSFERRIN SERPL-MCNC: 284 MG/DL (ref 200–360)
WBC NRBC COR # BLD: 6.03 10*3/MM3 (ref 3.4–10.8)
WHOLE BLOOD HOLD SPECIMEN: NORMAL

## 2019-09-24 PROCEDURE — 85025 COMPLETE CBC W/AUTO DIFF WBC: CPT | Performed by: INTERNAL MEDICINE

## 2019-09-24 PROCEDURE — 83540 ASSAY OF IRON: CPT | Performed by: INTERNAL MEDICINE

## 2019-09-24 PROCEDURE — 36415 COLL VENOUS BLD VENIPUNCTURE: CPT

## 2019-09-24 PROCEDURE — 84466 ASSAY OF TRANSFERRIN: CPT | Performed by: INTERNAL MEDICINE

## 2019-09-24 PROCEDURE — 99205 OFFICE O/P NEW HI 60 MIN: CPT | Performed by: INTERNAL MEDICINE

## 2019-09-24 PROCEDURE — 82728 ASSAY OF FERRITIN: CPT | Performed by: INTERNAL MEDICINE

## 2019-09-24 RX ORDER — BEPOTASTINE BESILATE 15 MG/ML
SOLUTION/ DROPS OPHTHALMIC
Refills: 1 | COMMUNITY
Start: 2019-09-16 | End: 2020-03-05

## 2019-10-01 ENCOUNTER — TELEPHONE (OUTPATIENT)
Dept: ONCOLOGY | Facility: CLINIC | Age: 54
End: 2019-10-01

## 2019-10-01 NOTE — TELEPHONE ENCOUNTER
----- Message from Haile Blackmon II, MD sent at 10/1/2019 11:32 AM EDT -----   Can you please call her and tell her that her lab results are not expected back by the time she sees me on Thursday.  Please tell her I would like to have all the lab results to review with her when I see her.    I think it would be best to delay her appointment by a few weeks or so.  There is no rush to get back in and see me, it is better to wait until we have all the results.

## 2019-10-03 ENCOUNTER — APPOINTMENT (OUTPATIENT)
Dept: ONCOLOGY | Facility: CLINIC | Age: 54
End: 2019-10-03

## 2019-10-03 ENCOUNTER — APPOINTMENT (OUTPATIENT)
Dept: OTHER | Facility: HOSPITAL | Age: 54
End: 2019-10-03

## 2019-10-14 LAB — REF LAB TEST METHOD: NORMAL

## 2019-10-21 ENCOUNTER — HOSPITAL ENCOUNTER (OUTPATIENT)
Dept: SLEEP MEDICINE | Facility: HOSPITAL | Age: 54
Discharge: HOME | End: 2019-10-21
Admitting: NURSE PRACTITIONER

## 2019-10-21 DIAGNOSIS — R29.818 SUSPECTED SLEEP APNEA: ICD-10-CM

## 2019-10-21 DIAGNOSIS — R06.83 SNORING: ICD-10-CM

## 2019-10-21 DIAGNOSIS — R51.9 MORNING HEADACHE: ICD-10-CM

## 2019-10-21 PROCEDURE — 95806 SLEEP STUDY UNATT&RESP EFFT: CPT

## 2019-10-21 NOTE — PROGRESS NOTES
.     REASON FOR FOLLOWUP :    heterozygote for hemochromatosis gene mutation    HISTORY OF PRESENT ILLNESS:  The patient is a 54 y.o. year old female  who is here for follow-up with the above-mentioned history.    No new problems since last visit.  No complaints of chest pain, shortness of breath, pain or swelling of one leg more than the other.  Feels fine.  She is here to review lab work.    With effort, she is lost 12 pounds with the help of weight watchers over the past month.  She plans to continue this.    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 Use in each nostril as directed, Disp: 30 mL, Rfl: 1  •  BEPREVE 1.5 % solution, INSTILL 1 DROP INTO BOTH EYES 3 TIMES A DAY, Disp: , Rfl: 1  •  fluticasone (FLONASE) 50 MCG/ACT nasal spray, 2 sprays into the nostril(s) as directed by provider Daily., Disp: 1 bottle, Rfl: 3  •  levothyroxine (SYNTHROID, LEVOTHROID) 112 MCG tablet, TAKE 1 TABLET BY MOUTH ONCE DAILY, Disp: 90 tablet, Rfl: 0    ALLERGIES:     Allergies   Allergen Reactions   • Cefdinir        SOCIAL HISTORY:       Social History     Socioeconomic History   •  "Marital status:      Spouse name: Rogerio   • Number of children: 3   • Years of education: College   • Highest education level: Not on file   Occupational History   • Occupation:      Employer: [employer]   Tobacco Use   • Smoking status: Never Smoker   • Smokeless tobacco: Never Used   Substance and Sexual Activity   • Alcohol use: Yes     Comment: Socially   • Drug use: No   • Sexual activity: Defer         FAMILY HISTORY:  Family History   Problem Relation Age of Onset   • Diabetes Mother    • Hypertension Mother    • Heart disease Father    • Hypertension Father    • Diabetes Sister    • Hypertension Sister    • Diabetes Brother    • Hypertension Brother    • Breast cancer Maternal Aunt         Diagnosed in her 50s   • Hypertension Son        REVIEW OF SYSTEMS:  Review of Systems   Constitutional: Negative for activity change.   HENT: Negative for nosebleeds and trouble swallowing.    Respiratory: Negative for shortness of breath and wheezing.    Cardiovascular: Negative for chest pain and palpitations.   Gastrointestinal: Negative for constipation, diarrhea and nausea.   Genitourinary: Negative for dysuria and hematuria.   Musculoskeletal: Negative for arthralgias and myalgias.   Skin: Negative for rash and wound.   Neurological: Negative for seizures and syncope.   Hematological: Negative for adenopathy. Does not bruise/bleed easily.   Psychiatric/Behavioral: Negative for confusion.              Vitals:    10/23/19 0820   BP: 118/78   Pulse: 76   Resp: 16   Temp: 98.4 °F (36.9 °C)   SpO2: 97%   Weight: 66.2 kg (145 lb 14.4 oz)   Height: 156.2 cm (61.5\")   PainSc: 0-No pain     Current Status 10/23/2019   ECOG score 0      PHYSICAL EXAM:    CONSTITUTIONAL:  Vital signs reviewed.  No distress, looks comfortable.  EYES:  Conjunctiva and lids unremarkable.  PERRLA  EARS,NOSE,MOUTH,THROAT:  Ears and nose appear unremarkable.  Lips, teeth, gums appear " unremarkable.  RESPIRATORY:  Normal respiratory effort.  Lungs clear to auscultation bilaterally.  CARDIOVASCULAR:  Normal S1, S2.  No murmurs rubs or gallops.  No significant lower extremity edema.  GASTROINTESTINAL: Abdomen appears unremarkable.  Nontender.  No hepatomegaly.  No splenomegaly.  LYMPHATIC:  No cervical, supraclavicular, axillary lymphadenopathy.  SKIN:  Warm.  No rashes.  PSYCHIATRIC:  Normal judgment and insight.  Normal mood and affect.      RECENT LABS:        WBC   Date Value Ref Range Status   09/24/2019 6.03 3.40 - 10.80 10*3/mm3 Final   08/05/2019 6.53 3.40 - 10.80 10*3/mm3 Final   09/17/2018 8.25 4.50 - 10.70 10*3/mm3 Final   08/31/2018 6.18 4.50 - 10.70 10*3/mm3 Final   01/04/2018 6.78 4.50 - 10.70 10*3/mm3 Final   11/29/2016 5.48 4.50 - 10.70 10*3/mm3 Final   06/24/2015 5.56 4.50 - 10.70 K/Cumm Final   06/01/2015 6.4 3.4 - 10.8 x10E3/uL Final   05/13/2015 14.04 (H) 4.50 - 10.70 K/Cumm Final   05/04/2015 6.95 4.50 - 10.70 K/Cumm Final     Hemoglobin   Date Value Ref Range Status   09/24/2019 15.1 12.0 - 15.9 g/dL Final   08/05/2019 15.4 12.0 - 15.9 g/dL Final   09/17/2018 15.0 11.9 - 15.5 g/dL Final   08/31/2018 15.6 (H) 11.9 - 15.5 g/dL Final   01/04/2018 15.7 (H) 11.9 - 15.5 g/dL Final   11/29/2016 15.4 11.9 - 15.5 g/dL Final   06/24/2015 15.4 11.9 - 15.5 g/dL Final   06/01/2015 14.6 11.1 - 15.9 g/dL Final   05/13/2015 14.6 11.9 - 15.5 g/dL Final   05/04/2015 15.6 (H) 11.9 - 15.5 g/dL Final     Platelets   Date Value Ref Range Status   09/24/2019 241 140 - 450 10*3/mm3 Final   08/05/2019 254 140 - 450 10*3/mm3 Final   09/17/2018 208 140 - 500 10*3/mm3 Final   08/31/2018 225 140 - 500 10*3/mm3 Final   01/04/2018 282 140 - 500 10*3/mm3 Final   11/29/2016 261 140 - 500 10*3/mm3 Final   06/24/2015 275 140 - 500 K/Cumm Final   06/01/2015 332 150 - 379 x10E3/uL Final   05/13/2015 278 140 - 500 K/Cumm Final   05/04/2015 281 140 - 500 K/Cumm Final       Assessment/Plan    Polycythemia      *Hemochromatosis C282Y heterozygous gene mutation  · Oftentimes, even homozygote C282Y gene mutation patients do not develop the clinical problem of hemochromatosis due to low penetrance.  It would be significantly more unusual for a C282Y heterozygote to develop the clinical problem of hemochromatosis.  · Clinical hemochromatosis typically has iron saturation >50%  · On 7/17/2015, while ferritin 159, only 17% saturation.  · On 9/24/2019, only 16% saturation with ferritin 206.  · I suspect her mild elevation of ferritin is due to fatty liver.  I do not think she needs any further evaluation or treatment for hemochromatosis.  I see no signs of clinical hemochromatosis.    *Elevated ferritin, around 200.  I suspect this is due to liver disease.  Patient states she has fatty liver.  She frequently has mildly elevated transaminases.  On 9/24/2019, only 16% saturation.  Therefore, unlikely to represent hemochromatosis.    *Secondary polycythemia  · On 9/18/2015, KILO 2 V617F and BCR ABL negative.  On 9/24/2019, Kilo 2 exon 12, negative.    · On 9/18/2015, CT abdomen and pelvis unremarkable (done looking for possible malignancy as the cause of polycythemia)  · Polycythemia vera is positive for either KILO 2 V617F or exon 12 and >95% of cases.  Furthermore, clinically she does not have findings to suggest polycythemia.  Therefore, I suspect this is secondary polycythemia.  In secondary polycythemia, there is no established target hematocrit for phlebotomies because secondary polycythemia is not believed to have the same risk of clotting as polycythemia vera.    *Suspected sleep apnea.  Was seen by pulmonary 8/14/2019 with plans for sleep study.  She should have this.  This might be the cause of secondary polycythemia.    *Overweight.  This might be the cause of her fatty liver.  I congratulated her on her 12 pound weight loss.    *Fatty liver.  This can lead to cytopenias and cirrhosis.    Plan  · No  follow-up needed here  · I do not think she needs any phlebotomies for secondary polycythemia or heterozygous hemochromatosis gene mutation.

## 2019-10-23 ENCOUNTER — APPOINTMENT (OUTPATIENT)
Dept: LAB | Facility: HOSPITAL | Age: 54
End: 2019-10-23

## 2019-10-23 ENCOUNTER — OFFICE VISIT (OUTPATIENT)
Dept: ONCOLOGY | Facility: CLINIC | Age: 54
End: 2019-10-23

## 2019-10-23 VITALS
BODY MASS INDEX: 27.55 KG/M2 | SYSTOLIC BLOOD PRESSURE: 118 MMHG | HEIGHT: 61 IN | TEMPERATURE: 98.4 F | RESPIRATION RATE: 16 BRPM | HEART RATE: 76 BPM | WEIGHT: 145.9 LBS | OXYGEN SATURATION: 97 % | DIASTOLIC BLOOD PRESSURE: 78 MMHG

## 2019-10-23 DIAGNOSIS — D75.1 POLYCYTHEMIA: Primary | ICD-10-CM

## 2019-10-23 PROCEDURE — G0463 HOSPITAL OUTPT CLINIC VISIT: HCPCS | Performed by: INTERNAL MEDICINE

## 2019-10-23 PROCEDURE — 99214 OFFICE O/P EST MOD 30 MIN: CPT | Performed by: INTERNAL MEDICINE

## 2019-10-25 ENCOUNTER — TELEPHONE (OUTPATIENT)
Dept: SLEEP MEDICINE | Facility: HOSPITAL | Age: 54
End: 2019-10-25

## 2019-11-25 RX ORDER — LEVOTHYROXINE SODIUM 112 UG/1
TABLET ORAL
Qty: 90 TABLET | Refills: 0 | Status: SHIPPED | OUTPATIENT
Start: 2019-11-25 | End: 2020-02-27

## 2020-02-11 ENCOUNTER — OFFICE VISIT (OUTPATIENT)
Dept: FAMILY MEDICINE CLINIC | Facility: CLINIC | Age: 55
End: 2020-02-11

## 2020-02-11 VITALS
RESPIRATION RATE: 16 BRPM | SYSTOLIC BLOOD PRESSURE: 112 MMHG | OXYGEN SATURATION: 99 % | BODY MASS INDEX: 26.06 KG/M2 | DIASTOLIC BLOOD PRESSURE: 80 MMHG | WEIGHT: 138 LBS | TEMPERATURE: 98.3 F | HEIGHT: 61 IN | HEART RATE: 71 BPM

## 2020-02-11 DIAGNOSIS — R35.0 URINE FREQUENCY: Primary | ICD-10-CM

## 2020-02-11 LAB
BILIRUB BLD-MCNC: NEGATIVE MG/DL
CLARITY, POC: CLEAR
COLOR UR: YELLOW
GLUCOSE UR STRIP-MCNC: NEGATIVE MG/DL
KETONES UR QL: NEGATIVE
LEUKOCYTE EST, POC: ABNORMAL
NITRITE UR-MCNC: NEGATIVE MG/ML
PH UR: 5.5 [PH] (ref 5–8)
PROT UR STRIP-MCNC: NEGATIVE MG/DL
RBC # UR STRIP: NEGATIVE /UL
SP GR UR: 1.03 (ref 1–1.03)
UROBILINOGEN UR QL: NORMAL

## 2020-02-11 PROCEDURE — 81003 URINALYSIS AUTO W/O SCOPE: CPT | Performed by: NURSE PRACTITIONER

## 2020-02-11 PROCEDURE — 99213 OFFICE O/P EST LOW 20 MIN: CPT | Performed by: NURSE PRACTITIONER

## 2020-02-11 RX ORDER — NITROFURANTOIN 25; 75 MG/1; MG/1
100 CAPSULE ORAL 2 TIMES DAILY
Qty: 14 CAPSULE | Refills: 0 | Status: SHIPPED | OUTPATIENT
Start: 2020-02-11 | End: 2020-03-05

## 2020-02-11 NOTE — PROGRESS NOTES
"Subjective   Yomaira Ball is a 54 y.o. female.     Chief Complaint   Patient presents with   • Urinary Frequency     x thursday, pt states burning and itching when urinating and lower abdomen pain       HPI  Patient known to me for previous acute visit.  Today she has a new problem.  Thinks she has a UTI.  Started this past Thursday with frequency, dysuria.  Felt a little feverish over the weekend and increased hydration and started drinking cranberry juice and felt a little better.  Sx worsened again yesterday.   Does not tend to get many UTIs.  Had IC on Wednesday night and tries to make sure she voids post IC.        Social History     Tobacco Use   • Smoking status: Never Smoker   • Smokeless tobacco: Never Used   Substance Use Topics   • Alcohol use: Yes     Comment: Socially   • Drug use: No       The following portions of the patient's history were reviewed and updated as appropriate: allergies, current medications, past family history, past medical history, past social history, past surgical history and problem list.    Review of Systems   Constitutional: Positive for chills (over weekend, now resolved). Negative for activity change and appetite change.   Respiratory: Negative for cough and shortness of breath.    Cardiovascular: Negative for chest pain and palpitations.   Gastrointestinal: Positive for abdominal pain. Negative for diarrhea, nausea and vomiting.   Genitourinary: Positive for dysuria and urgency. Negative for difficulty urinating, flank pain and hematuria.   Musculoskeletal: Negative for back pain and myalgias.   Neurological: Negative for weakness and headaches.       Objective   Blood pressure 112/80, pulse 71, temperature 98.3 °F (36.8 °C), temperature source Oral, resp. rate 16, height 156.2 cm (61.5\"), weight 62.6 kg (138 lb), SpO2 99 %.  Body mass index is 25.66 kg/m².    Physical Exam   Constitutional: She is oriented to person, place, and time. She appears well-developed and " well-nourished. No distress.   HENT:   Head: Normocephalic and atraumatic.   Mouth/Throat: Oropharynx is clear and moist.   Eyes: Conjunctivae are normal. Right eye exhibits no discharge. Left eye exhibits no discharge.   Cardiovascular: Normal rate and regular rhythm.   Pulmonary/Chest: Effort normal and breath sounds normal.   Abdominal: Soft. Bowel sounds are normal. She exhibits no distension. There is tenderness in the suprapubic area. There is no CVA tenderness.   Musculoskeletal: She exhibits no deformity.   Gait smooth and steady   Neurological: She is alert and oriented to person, place, and time.   Skin: Skin is warm and dry. She is not diaphoretic.   Psychiatric: She has a normal mood and affect.   Nursing note and vitals reviewed.      Assessment   Problem List Items Addressed This Visit     None      Visit Diagnoses     Urine frequency    -  Primary    Relevant Medications    nitrofurantoin, macrocrystal-monohydrate, (MACROBID) 100 MG capsule    Other Relevant Orders    POC Urinalysis Dipstick, Automated (Completed)           Procedures           Impression and Plan:  UA was nonspecific-had few leuks.  We discussed options for tx including watching and waiting for cx or abx starting today for presumptive UTI.  Given the amoount of time since her sx began seems reasonable to treat for presumptive UTI while awaiting cx.  Will start macrobid and cx urine.  Side effects reviewed.  We discussed s/s requiring emergent tx or follow up.  Extra hydration reviewed.     New problem to me requiring additional w/u.   Labs ordered  MDM:  low        Health Maintenance Due   Topic Date Due   • ZOSTER VACCINE (1 of 2) 10/08/2013   • PAP SMEAR  02/01/2019   • ANNUAL PHYSICAL  09/07/2019              EMR Dragon/Transcription disclaimer:   Much of this encounter note is an electronic transcription/translation of spoken language to printed text. The electronic translation of spoken language may permit erroneous, or at  times, nonsensical words or phrases to be inadvertently transcribed; Although I have reviewed the note for such errors, some may still exist.      Urinary Tract Infection, Adult  A urinary tract infection (UTI) is an infection of any part of the urinary tract. The urinary tract includes:  · The kidneys.  · The ureters.  · The bladder.  · The urethra.  These organs make, store, and get rid of pee (urine) in the body.  What are the causes?  This is caused by germs (bacteria) in your genital area. These germs grow and cause swelling (inflammation) of your urinary tract.  What increases the risk?  You are more likely to develop this condition if:  · You have a small, thin tube (catheter) to drain pee.  · You cannot control when you pee or poop (incontinence).  · You are female, and:  ? You use these methods to prevent pregnancy:  ? A medicine that kills sperm (spermicide).  ? A device that blocks sperm (diaphragm).  ? You have low levels of a female hormone (estrogen).  ? You are pregnant.  · You have genes that add to your risk.  · You are sexually active.  · You take antibiotic medicines.  · You have trouble peeing because of:  ? A prostate that is bigger than normal, if you are male.  ? A blockage in the part of your body that drains pee from the bladder (urethra).  ? A kidney stone.  ? A nerve condition that affects your bladder (neurogenic bladder).  ? Not getting enough to drink.  ? Not peeing often enough.  · You have other conditions, such as:  ? Diabetes.  ? A weak disease-fighting system (immune system).  ? Sickle cell disease.  ? Gout.  ? Injury of the spine.  What are the signs or symptoms?  Symptoms of this condition include:  · Needing to pee right away (urgently).  · Peeing often.  · Peeing small amounts often.  · Pain or burning when peeing.  · Blood in the pee.  · Pee that smells bad or not like normal.  · Trouble peeing.  · Pee that is cloudy.  · Fluid coming from the vagina, if you are female.  · Pain  in the belly or lower back.  Other symptoms include:  · Throwing up (vomiting).  · No urge to eat.  · Feeling mixed up (confused).  · Being tired and grouchy (irritable).  · A fever.  · Watery poop (diarrhea).  How is this treated?  This condition may be treated with:  · Antibiotic medicine.  · Other medicines.  · Drinking enough water.  Follow these instructions at home:    Medicines  · Take over-the-counter and prescription medicines only as told by your doctor.  · If you were prescribed an antibiotic medicine, take it as told by your doctor. Do not stop taking it even if you start to feel better.  General instructions  · Make sure you:  ? Pee until your bladder is empty.   ? Do not hold pee for a long time.  ? Empty your bladder after sex.  ? Wipe from front to back after pooping if you are a female. Use each tissue one time when you wipe.  · Drink enough fluid to keep your pee pale yellow.  · Keep all follow-up visits as told by your doctor. This is important.  Contact a doctor if:  · You do not get better after 1-2 days.  · Your symptoms go away and then come back.  Get help right away if:  · You have very bad back pain.  · You have very bad pain in your lower belly.  · You have a fever.  · You are sick to your stomach (nauseous).  · You are throwing up.  Summary  · A urinary tract infection (UTI) is an infection of any part of the urinary tract.  · This condition is caused by germs in your genital area.  · There are many risk factors for a UTI. These include having a small, thin tube to drain pee and not being able to control when you pee or poop.  · Treatment includes antibiotic medicines for germs.  · Drink enough fluid to keep your pee pale yellow.  This information is not intended to replace advice given to you by your health care provider. Make sure you discuss any questions you have with your health care provider.  Document Released: 06/05/2009 Document Revised: 06/27/2019 Document Reviewed:  06/27/2019  Elsevier Interactive Patient Education © 2019 [employer]

## 2020-02-14 LAB
BACTERIA UR CULT: ABNORMAL
BACTERIA UR CULT: ABNORMAL
OTHER ANTIBIOTIC SUSC ISLT: ABNORMAL

## 2020-02-27 RX ORDER — LEVOTHYROXINE SODIUM 112 UG/1
TABLET ORAL
Qty: 90 TABLET | Refills: 0 | Status: SHIPPED | OUTPATIENT
Start: 2020-02-27 | End: 2020-06-03 | Stop reason: SDUPTHER

## 2020-03-05 ENCOUNTER — OFFICE VISIT (OUTPATIENT)
Dept: FAMILY MEDICINE CLINIC | Facility: CLINIC | Age: 55
End: 2020-03-05

## 2020-03-05 VITALS
TEMPERATURE: 96.2 F | HEART RATE: 65 BPM | HEIGHT: 62 IN | WEIGHT: 138.8 LBS | SYSTOLIC BLOOD PRESSURE: 104 MMHG | DIASTOLIC BLOOD PRESSURE: 72 MMHG | OXYGEN SATURATION: 99 % | BODY MASS INDEX: 25.54 KG/M2 | RESPIRATION RATE: 16 BRPM

## 2020-03-05 DIAGNOSIS — N30.01 ACUTE CYSTITIS WITH HEMATURIA: Primary | ICD-10-CM

## 2020-03-05 LAB
BILIRUB BLD-MCNC: NEGATIVE MG/DL
CLARITY, POC: CLEAR
COLOR UR: YELLOW
GLUCOSE UR STRIP-MCNC: NEGATIVE MG/DL
KETONES UR QL: NEGATIVE
LEUKOCYTE EST, POC: ABNORMAL
NITRITE UR-MCNC: NEGATIVE MG/ML
PH UR: 5.5 [PH] (ref 5–8)
PROT UR STRIP-MCNC: NEGATIVE MG/DL
RBC # UR STRIP: ABNORMAL /UL
SP GR UR: 1.02 (ref 1–1.03)
UROBILINOGEN UR QL: NORMAL

## 2020-03-05 PROCEDURE — 99213 OFFICE O/P EST LOW 20 MIN: CPT | Performed by: NURSE PRACTITIONER

## 2020-03-05 PROCEDURE — 81003 URINALYSIS AUTO W/O SCOPE: CPT | Performed by: NURSE PRACTITIONER

## 2020-03-05 RX ORDER — OLOPATADINE HYDROCHLORIDE 2 MG/ML
SOLUTION/ DROPS OPHTHALMIC
COMMUNITY
Start: 2020-02-27 | End: 2020-07-17

## 2020-03-05 RX ORDER — PHENAZOPYRIDINE HYDROCHLORIDE 100 MG/1
100 TABLET, FILM COATED ORAL 3 TIMES DAILY PRN
Qty: 6 TABLET | Refills: 0 | Status: SHIPPED | OUTPATIENT
Start: 2020-03-05 | End: 2020-07-17

## 2020-03-05 RX ORDER — NITROFURANTOIN 25; 75 MG/1; MG/1
100 CAPSULE ORAL 2 TIMES DAILY
Qty: 14 CAPSULE | Refills: 0 | Status: SHIPPED | OUTPATIENT
Start: 2020-03-05 | End: 2020-07-17

## 2020-03-05 NOTE — PROGRESS NOTES
Subjective   Yomaira Ball is a 54 y.o. female.     Chief Complaint   Patient presents with   • Cystitis     f/u d/t symptoms recurring aeb pain and blood in urine noticably on 2 occassions. Finished antibiotics 2/18/20.      HPI patient returns today for recurrence of her UTI symptoms.  She was seen by myself at the beginning of February for UTI symptoms.  These began after sexual intercourse and she completed Macrobid on 2/18/2020 with resolution of her symptoms.  Yesterday she began noticing similar symptoms with dysuria and bladder spasms with urination last night after dinner.  She drank extra water and some cranberry juice and symptoms seemed to get a little bit better today.  Last intercourse was this past Sunday.  He does not get UTIs very frequently.  She also reports that she has had an increase in allergic symptoms and recently started on steroidal eyedrops.    Social History     Tobacco Use   • Smoking status: Never Smoker   • Smokeless tobacco: Never Used   Substance Use Topics   • Alcohol use: Yes     Comment: Socially   • Drug use: No       The following portions of the patient's history were reviewed and updated as appropriate: allergies, current medications, past family history, past medical history, past social history, past surgical history and problem list.    Review of Systems   Constitutional: Negative for chills and fever.        Last night just did not feel well   Respiratory: Negative for cough and shortness of breath.    Cardiovascular: Negative for chest pain and palpitations.   Gastrointestinal: Positive for abdominal pain and nausea (She reports a little bit of nausea last night). Negative for blood in stool, constipation, diarrhea and vomiting.   Genitourinary: Positive for dysuria, frequency, pelvic pain and urgency. Negative for decreased urine volume, difficulty urinating, dyspareunia, flank pain, genital sores, hematuria, vaginal bleeding and vaginal discharge.   Musculoskeletal:  "Negative for arthralgias and back pain.   Allergic/Immunologic: Positive for environmental allergies.   Neurological: Negative for weakness and headaches.       Objective   Blood pressure 104/72, pulse 65, temperature 96.2 °F (35.7 °C), resp. rate 16, height 156.2 cm (61.5\"), weight 63 kg (138 lb 12.8 oz), SpO2 99 %.  Body mass index is 25.8 kg/m².    Physical Exam   Constitutional: She is oriented to person, place, and time. She appears well-developed and well-nourished. No distress.   HENT:   Head: Normocephalic and atraumatic.   Mouth/Throat: Oropharynx is clear and moist.   Eyes: Conjunctivae are normal. Right eye exhibits no discharge. Left eye exhibits no discharge.   Cardiovascular: Normal rate and regular rhythm.   Pulmonary/Chest: Effort normal and breath sounds normal.   Abdominal: Soft. Bowel sounds are normal. There is tenderness in the suprapubic area. There is no CVA tenderness.   Musculoskeletal: She exhibits no deformity.   Gait smooth and steady   Neurological: She is alert and oriented to person, place, and time.   Skin: Skin is warm and dry. She is not diaphoretic.   Psychiatric: She has a normal mood and affect.   Nursing note and vitals reviewed.      Assessment   Problem List Items Addressed This Visit     None      Visit Diagnoses     Acute cystitis with hematuria    -  Primary    Relevant Medications    nitrofurantoin, macrocrystal-monohydrate, (MACROBID) 100 MG capsule    phenazopyridine (PYRIDIUM) 100 MG tablet    Other Relevant Orders    Urine Culture - Urine, Urine, Clean Catch    POCT urinalysis dipstick, automated (Completed)           Procedures           Impression and Plan:  UA appears to show UTI-will send for culture.  Concerned that this is second UTI within a month.  Last UTI was E. coli and sensitive to Macrobid which was prescribed.    Both UTIs occurred within a few days of sexual intercourse.  She does report she urinates before and after sex.  She does feel that her UTI " reached a resolution with antibiotics last time.  Since she is feeling a little bit better today we willstart pyridium today with extra hydration and if urinary sx are not continuing to improve or worsen will start macrobid.  I have given her a prescription for Macrobid to use if needed.  I will send her urine for culture but will not be back most likely until this Monday.  Not want her to have to use the ER or urgent care over the weekend.  If she does indeed have another documented UTI by culture will have her get urine culture 72 hours after antibiotics completed to make sure she is resists solving the UTIs.  We discussed the possibility of taking prophylaxis Macrobid with intercourse.  We also discussed possibility of new interstitial cystitis.  We will know a little bit more once her urine culture is back.  I did discuss with her the signs and symptoms that would require a follow-up visit or emergent treatment over the weekend.    MDM more complex as this is either a new problem or a worsening or unresolved problem from last.    Health Maintenance Due   Topic Date Due   • PNEUMOCOCCAL VACCINE (19-64 MEDIUM RISK) (1 of 1 - PPSV23) 10/08/1982   • ZOSTER VACCINE (1 of 2) 10/08/2013   • PAP SMEAR  02/01/2019   • ANNUAL PHYSICAL  09/07/2019              EMR Dragon/Transcription disclaimer:   Much of this encounter note is an electronic transcription/translation of spoken language to printed text. The electronic translation of spoken language may permit erroneous, or at times, nonsensical words or phrases to be inadvertently transcribed; Although I have reviewed the note for such errors, some may still exist.

## 2020-03-07 LAB
BACTERIA UR CULT: NO GROWTH
BACTERIA UR CULT: NORMAL

## 2020-03-10 ENCOUNTER — TELEPHONE (OUTPATIENT)
Dept: FAMILY MEDICINE CLINIC | Facility: CLINIC | Age: 55
End: 2020-03-10

## 2020-03-10 NOTE — TELEPHONE ENCOUNTER
----- Message from NENO Potter sent at 3/10/2020  4:30 PM EDT -----  Sometimes it can be interstitial cystitis, sometimes the culture does not catch anything and people clear infection themselves with hydration.  If she has any further symptoms she needs to let us know-Ozzy  ----- Message -----  From: Jocelyn Carroll MA  Sent: 3/9/2020   9:40 AM EDT  To: NENO Potter    Called and spoke to patient about lab results. Pt did not the antibiotic but did take the pyridium. She is not having any symptoms but wanted to know if it was not an infection what would have called the abdominal pain and blood in the urine. She is wanting to know if she should be concerned about this.

## 2020-05-27 ENCOUNTER — APPOINTMENT (OUTPATIENT)
Dept: WOMENS IMAGING | Facility: HOSPITAL | Age: 55
End: 2020-05-27

## 2020-05-27 PROCEDURE — 77063 BREAST TOMOSYNTHESIS BI: CPT | Performed by: RADIOLOGY

## 2020-05-27 PROCEDURE — 77067 SCR MAMMO BI INCL CAD: CPT | Performed by: RADIOLOGY

## 2020-06-05 RX ORDER — LEVOTHYROXINE SODIUM 112 UG/1
112 TABLET ORAL DAILY
Qty: 90 TABLET | Refills: 0 | Status: SHIPPED | OUTPATIENT
Start: 2020-06-05 | End: 2020-07-20 | Stop reason: SDUPTHER

## 2020-07-17 ENCOUNTER — OFFICE VISIT (OUTPATIENT)
Dept: FAMILY MEDICINE CLINIC | Facility: CLINIC | Age: 55
End: 2020-07-17

## 2020-07-17 VITALS
BODY MASS INDEX: 27.03 KG/M2 | DIASTOLIC BLOOD PRESSURE: 76 MMHG | SYSTOLIC BLOOD PRESSURE: 118 MMHG | HEART RATE: 86 BPM | TEMPERATURE: 97.1 F | RESPIRATION RATE: 16 BRPM | WEIGHT: 146.9 LBS | HEIGHT: 62 IN | OXYGEN SATURATION: 99 %

## 2020-07-17 DIAGNOSIS — Z00.00 PHYSICAL EXAM: ICD-10-CM

## 2020-07-17 DIAGNOSIS — E03.9 HYPOTHYROIDISM, UNSPECIFIED TYPE: ICD-10-CM

## 2020-07-17 DIAGNOSIS — R73.9 HYPERGLYCEMIA: ICD-10-CM

## 2020-07-17 DIAGNOSIS — Z00.00 WELL ADULT EXAM: ICD-10-CM

## 2020-07-17 DIAGNOSIS — E78.5 HYPERLIPIDEMIA, UNSPECIFIED HYPERLIPIDEMIA TYPE: Primary | ICD-10-CM

## 2020-07-17 PROCEDURE — 99396 PREV VISIT EST AGE 40-64: CPT | Performed by: INTERNAL MEDICINE

## 2020-07-17 RX ORDER — MAGNESIUM OXIDE 400 MG/1
1 TABLET ORAL
COMMUNITY
Start: 2020-06-28 | End: 2021-02-12

## 2020-07-17 RX ORDER — ESTRADIOL 10 UG/1
INSERT VAGINAL
COMMUNITY
Start: 2020-06-08 | End: 2021-02-12

## 2020-07-17 NOTE — PROGRESS NOTES
"Subjective   Yomaira Ball is a 54 y.o. female who comes in today for No chief complaint on file.  .    History of Present Illness   Here for CPE.  Lost weight and then has gained it back.  Working from home since March for [employer].  eatingToo many snacks.  Saw Dr. Tran in May 2020 and did pelvic exam and MMG.  Doesn't do pap smears b/c has had hysterectomy. She is due for CN with Dr. Torres.  She will set it up with her.  Had some eye changes a year ago and eye doc said that she had allergy issues.  Allergist Dr. Vazquez sent her to get patch testing done.  She was found to be allergic to two ingredients that are in shampoos, make up and eye drops etc.  She avoids those ingredients and her eyes are much better.  Went back to eye doc for f/u and he used the same drops and her eyes swelled and got puffy and red.   also suggested coq 10 and Mg++for ha prevention and it is helping.  Less constipation as well.  Saw [facility].  Has an area to left of left corner of mouth that is raised and was a previous bug bite she thinks.  No redness or bleeding from area.  Not sore.  Appeared after the bug bite.  The following portions of the patient's history were reviewed and updated as appropriate: allergies, current medications, past family history, past medical history, past social history, past surgical history and problem list.    Review of Systems   Constitutional: Positive for unexpected weight change.   Skin: Positive for rash.   Psychiatric/Behavioral: Negative.    All other systems reviewed and are negative.      /76   Pulse 86   Temp 97.1 °F (36.2 °C) (Temporal)   Resp 16   Ht 156.2 cm (61.5\")   Wt 66.6 kg (146 lb 14.4 oz)   LMP  (LMP Unknown)   SpO2 99%   BMI 27.31 kg/m²     STEADI Fall Risk Assessment has not been completed.    PHQ-2/PHQ-9 Depression Screening 2/11/2020   Little interest or pleasure in doing things 0   Feeling down, depressed, or hopeless 0   Total Score 0       Objective "   Physical Exam   Constitutional: She is oriented to person, place, and time. She appears well-developed and well-nourished.   HENT:   Head: Normocephalic and atraumatic.   Eyes: Conjunctivae are normal.   Neck: Neck supple.   Cardiovascular: Normal rate, regular rhythm and normal heart sounds.   No bruits   Pulmonary/Chest: Effort normal and breath sounds normal. No respiratory distress. She has no wheezes. She has no rales.   Abdominal: Soft. Bowel sounds are normal. She exhibits no distension and no mass. There is no tenderness.   Lymphadenopathy:     She has no cervical adenopathy.   Neurological: She is alert and oriented to person, place, and time.   Skin: Skin is warm.   Couch of normal tissue on surface of skin of face corner of left mouth   Psychiatric: She has a normal mood and affect. Her behavior is normal. Judgment and thought content normal.   Nursing note and vitals reviewed.        Current Outpatient Medications:   •  Coenzyme Q10 (CO Q 10) 100 MG capsule, , Disp: , Rfl:   •  estradiol (Yuvafem) 10 MCG tablet vaginal tablet, , Disp: , Rfl:   •  levothyroxine (SYNTHROID, LEVOTHROID) 112 MCG tablet, Take 1 tablet by mouth Daily., Disp: 90 tablet, Rfl: 0  •  fluticasone (FLONASE) 50 MCG/ACT nasal spray, 2 sprays into the nostril(s) as directed by provider Daily., Disp: 1 bottle, Rfl: 3  •  magnesium oxide (MAG-OX) 400 MG tablet, Take 1 tablet by mouth every night at bedtime., Disp: , Rfl:     Assessment/Plan   Diagnoses and all orders for this visit:    Hyperlipidemia, unspecified hyperlipidemia type  -     Comprehensive Metabolic Panel  -     CBC & Differential  -     Hemoglobin A1c  -     Lipid Panel With LDL / HDL Ratio  -     TSH  -     T4, Free  -     Urinalysis With Culture If Indicated -  -     Vitamin D 25 Hydroxy    Hypothyroidism, unspecified type  -     Comprehensive Metabolic Panel  -     CBC & Differential  -     Hemoglobin A1c  -     Lipid Panel With LDL / HDL Ratio  -     TSH  -     T4,  Free  -     Urinalysis With Culture If Indicated -  -     Vitamin D 25 Hydroxy    Hyperglycemia  -     Comprehensive Metabolic Panel  -     CBC & Differential  -     Hemoglobin A1c  -     Lipid Panel With LDL / HDL Ratio  -     TSH  -     T4, Free  -     Urinalysis With Culture If Indicated -  -     Vitamin D 25 Hydroxy    Physical exam  -     Comprehensive Metabolic Panel  -     CBC & Differential  -     Hemoglobin A1c  -     Lipid Panel With LDL / HDL Ratio  -     TSH  -     T4, Free  -     Urinalysis With Culture If Indicated -  -     Vitamin D 25 Hydroxy    Well adult exam  -     Comprehensive Metabolic Panel  -     CBC & Differential  -     Hemoglobin A1c  -     Lipid Panel With LDL / HDL Ratio  -     TSH  -     T4, Free  -     Urinalysis With Culture If Indicated -  -     Vitamin D 25 Hydroxy    Other orders  -     Microscopic Examination -    continue mg and co q 10 for ha prevention  Fasting labs  Check a1c due to h/o hyperglycemia  Healthy lifestyle discussed  Probable dermatofibroma--rec that she fu with [facility]   Continue levoxyl 100 mcg for HT.  Check labs                 I have asked for the patient to return to clinic in 6month(s).

## 2020-07-18 LAB
25(OH)D3+25(OH)D2 SERPL-MCNC: 33.8 NG/ML (ref 30–100)
ALBUMIN SERPL-MCNC: 5 G/DL (ref 3.5–5.2)
ALBUMIN/GLOB SERPL: 2.4 G/DL
ALP SERPL-CCNC: 70 U/L (ref 39–117)
ALT SERPL-CCNC: 36 U/L (ref 1–33)
APPEARANCE UR: CLEAR
AST SERPL-CCNC: 25 U/L (ref 1–32)
BACTERIA #/AREA URNS HPF: NORMAL /HPF
BASOPHILS # BLD AUTO: 0.09 10*3/MM3 (ref 0–0.2)
BASOPHILS NFR BLD AUTO: 1.5 % (ref 0–1.5)
BILIRUB SERPL-MCNC: 1.8 MG/DL (ref 0–1.2)
BILIRUB UR QL STRIP: NEGATIVE
BUN SERPL-MCNC: 12 MG/DL (ref 6–20)
BUN/CREAT SERPL: 17.1 (ref 7–25)
CALCIUM SERPL-MCNC: 10.2 MG/DL (ref 8.6–10.5)
CHLORIDE SERPL-SCNC: 103 MMOL/L (ref 98–107)
CHOLEST SERPL-MCNC: 190 MG/DL (ref 0–200)
CO2 SERPL-SCNC: 25.5 MMOL/L (ref 22–29)
COLOR UR: YELLOW
CREAT SERPL-MCNC: 0.7 MG/DL (ref 0.57–1)
EOSINOPHIL # BLD AUTO: 0.15 10*3/MM3 (ref 0–0.4)
EOSINOPHIL NFR BLD AUTO: 2.5 % (ref 0.3–6.2)
EPI CELLS #/AREA URNS HPF: NORMAL /HPF (ref 0–10)
ERYTHROCYTE [DISTWIDTH] IN BLOOD BY AUTOMATED COUNT: 13.2 % (ref 12.3–15.4)
GLOBULIN SER CALC-MCNC: 2.1 GM/DL
GLUCOSE SERPL-MCNC: 87 MG/DL (ref 65–99)
GLUCOSE UR QL: NEGATIVE
HBA1C MFR BLD: 5.49 % (ref 4.8–5.6)
HCT VFR BLD AUTO: 45.7 % (ref 34–46.6)
HDLC SERPL-MCNC: 50 MG/DL (ref 40–60)
HGB BLD-MCNC: 15.6 G/DL (ref 12–15.9)
HGB UR QL STRIP: NEGATIVE
IMM GRANULOCYTES # BLD AUTO: 0.04 10*3/MM3 (ref 0–0.05)
IMM GRANULOCYTES NFR BLD AUTO: 0.7 % (ref 0–0.5)
KETONES UR QL STRIP: NEGATIVE
LDLC SERPL CALC-MCNC: 116 MG/DL (ref 0–100)
LDLC/HDLC SERPL: 2.32 {RATIO}
LEUKOCYTE ESTERASE UR QL STRIP: NEGATIVE
LYMPHOCYTES # BLD AUTO: 2.06 10*3/MM3 (ref 0.7–3.1)
LYMPHOCYTES NFR BLD AUTO: 33.8 % (ref 19.6–45.3)
MCH RBC QN AUTO: 30 PG (ref 26.6–33)
MCHC RBC AUTO-ENTMCNC: 34.1 G/DL (ref 31.5–35.7)
MCV RBC AUTO: 87.9 FL (ref 79–97)
MICRO URNS: NORMAL
MICRO URNS: NORMAL
MONOCYTES # BLD AUTO: 0.51 10*3/MM3 (ref 0.1–0.9)
MONOCYTES NFR BLD AUTO: 8.4 % (ref 5–12)
NEUTROPHILS # BLD AUTO: 3.25 10*3/MM3 (ref 1.7–7)
NEUTROPHILS NFR BLD AUTO: 53.1 % (ref 42.7–76)
NITRITE UR QL STRIP: NEGATIVE
NRBC BLD AUTO-RTO: 0 /100 WBC (ref 0–0.2)
PH UR STRIP: 7 [PH] (ref 5–7.5)
PLATELET # BLD AUTO: 247 10*3/MM3 (ref 140–450)
POTASSIUM SERPL-SCNC: 4.8 MMOL/L (ref 3.5–5.2)
PROT SERPL-MCNC: 7.1 G/DL (ref 6–8.5)
PROT UR QL STRIP: NEGATIVE
RBC # BLD AUTO: 5.2 10*6/MM3 (ref 3.77–5.28)
RBC #/AREA URNS HPF: NORMAL /HPF (ref 0–2)
SODIUM SERPL-SCNC: 141 MMOL/L (ref 136–145)
SP GR UR: 1.01 (ref 1–1.03)
T4 FREE SERPL-MCNC: 1.84 NG/DL (ref 0.93–1.7)
TRIGL SERPL-MCNC: 121 MG/DL (ref 0–150)
TSH SERPL DL<=0.005 MIU/L-ACNC: 0.32 UIU/ML (ref 0.27–4.2)
URINALYSIS REFLEX: NORMAL
UROBILINOGEN UR STRIP-MCNC: 0.2 MG/DL (ref 0.2–1)
VLDLC SERPL CALC-MCNC: 24.2 MG/DL
WBC # BLD AUTO: 6.1 10*3/MM3 (ref 3.4–10.8)
WBC #/AREA URNS HPF: NORMAL /HPF (ref 0–5)

## 2020-07-20 DIAGNOSIS — E03.9 HYPOTHYROIDISM, UNSPECIFIED TYPE: Primary | ICD-10-CM

## 2020-07-20 DIAGNOSIS — E78.5 HYPERLIPIDEMIA, UNSPECIFIED HYPERLIPIDEMIA TYPE: ICD-10-CM

## 2020-07-20 RX ORDER — LEVOTHYROXINE SODIUM 0.1 MG/1
100 TABLET ORAL DAILY
Qty: 90 TABLET | Refills: 0 | Status: SHIPPED | OUTPATIENT
Start: 2020-07-20 | End: 2020-07-23 | Stop reason: SDUPTHER

## 2020-07-23 ENCOUNTER — TELEPHONE (OUTPATIENT)
Dept: FAMILY MEDICINE CLINIC | Facility: CLINIC | Age: 55
End: 2020-07-23

## 2020-07-23 RX ORDER — LEVOTHYROXINE SODIUM 0.1 MG/1
100 TABLET ORAL DAILY
Qty: 90 TABLET | Refills: 0 | Status: SHIPPED | OUTPATIENT
Start: 2020-07-23 | End: 2020-10-28

## 2020-07-23 NOTE — TELEPHONE ENCOUNTER
PATIENT WAS SEEN ON 7/17/2020 AND HER MEDICATION REFILL WAS CALLED IN TO THE WRONG PHARMACY..    levothyroxine (SYNTHROID, LEVOTHROID) 100 MCG tablet  PLEASE SEND TO     [facility] [address] [place] [id number] [facility] - [phone] [facility] [phone]   [phone]    PATIENT CALL BACK NUMBER [phone]

## 2020-09-08 ENCOUNTER — LAB REQUISITION (OUTPATIENT)
Dept: LAB | Facility: HOSPITAL | Age: 55
End: 2020-09-08

## 2020-09-08 DIAGNOSIS — Z00.00 ENCOUNTER FOR GENERAL ADULT MEDICAL EXAMINATION WITHOUT ABNORMAL FINDINGS: ICD-10-CM

## 2020-09-09 PROCEDURE — U0004 COV-19 TEST NON-CDC HGH THRU: HCPCS | Performed by: SURGERY

## 2020-09-10 LAB — SARS-COV-2 RNA RESP QL NAA+PROBE: NOT DETECTED

## 2020-09-11 ENCOUNTER — OUTSIDE FACILITY SERVICE (OUTPATIENT)
Dept: SURGERY | Facility: CLINIC | Age: 55
End: 2020-09-11

## 2020-09-11 PROCEDURE — 45378 DIAGNOSTIC COLONOSCOPY: CPT | Performed by: SURGERY

## 2020-10-16 ENCOUNTER — OFFICE VISIT (OUTPATIENT)
Dept: FAMILY MEDICINE CLINIC | Facility: CLINIC | Age: 55
End: 2020-10-16

## 2020-10-16 VITALS
BODY MASS INDEX: 27.97 KG/M2 | SYSTOLIC BLOOD PRESSURE: 138 MMHG | RESPIRATION RATE: 17 BRPM | HEIGHT: 62 IN | WEIGHT: 152 LBS | HEART RATE: 91 BPM | DIASTOLIC BLOOD PRESSURE: 72 MMHG | TEMPERATURE: 96.8 F | OXYGEN SATURATION: 91 %

## 2020-10-16 DIAGNOSIS — R04.0 EPISTAXIS: ICD-10-CM

## 2020-10-16 DIAGNOSIS — D75.1 POLYCYTHEMIA: ICD-10-CM

## 2020-10-16 DIAGNOSIS — R07.89 SENSATION OF CHEST PRESSURE: ICD-10-CM

## 2020-10-16 DIAGNOSIS — E03.9 HYPOTHYROIDISM, UNSPECIFIED TYPE: Primary | ICD-10-CM

## 2020-10-16 DIAGNOSIS — R42 LIGHTHEADEDNESS: ICD-10-CM

## 2020-10-16 DIAGNOSIS — Z23 ENCOUNTER FOR IMMUNIZATION: ICD-10-CM

## 2020-10-16 PROCEDURE — 99214 OFFICE O/P EST MOD 30 MIN: CPT | Performed by: INTERNAL MEDICINE

## 2020-10-16 PROCEDURE — 93000 ELECTROCARDIOGRAM COMPLETE: CPT | Performed by: INTERNAL MEDICINE

## 2020-10-16 NOTE — PROGRESS NOTES
"Subjective   Yomaira Ball is a 55 y.o. female who comes in today for   Chief Complaint   Patient presents with   • Dizziness     occurring several weeks, intermetent   .    History of Present Illness   Light headedness off and on few weeks.  Worst episode was while talking to her sister in law.  She felt like she might faint.  She hadn't eaten dinner.  Had snacked however.  She had drank coffee that morning and then drank decaf tea with stevia throughout the day.  90 oz tea/day.  No alcohol.  Vision has never been right since her eyes after they used a product on her eyes that she was allergic to.  She wonders if needs new glasses.  No n/v.  Dull wheeler in back of head and her allergist Dr. Vazquez advised her to take MG++ and CoQ 10 qhs which were helping her ha's and now she only takes them on occasion.  Has had 5-10 episodes past 2 months and usually they last 1-4 seconds in duration.  No vertigo.  No spinning.  While watching TV a few weeks ago, she had a sensation in her chest that made her feel like she swallowed something too big. Lasted 10 minutes.  No soa or palpitations.       The following portions of the patient's history were reviewed and updated as appropriate: allergies, current medications, past family history, past medical history, past social history, past surgical history and problem list.    Review of Systems   Respiratory: Negative.    Cardiovascular: Negative.    Neurological: Positive for light-headedness.       /72 (BP Location: Right arm, Patient Position: Sitting, Cuff Size: Adult)   Pulse 91   Temp 96.8 °F (36 °C) (Temporal)   Resp 17   Ht 156.2 cm (61.5\")   Wt 68.9 kg (152 lb)   LMP  (LMP Unknown)   SpO2 91%   Breastfeeding No   BMI 28.26 kg/m²     STEADI Fall Risk Assessment has not been completed.    PHQ-2/PHQ-9 Depression Screening 2/11/2020   Little interest or pleasure in doing things 0   Feeling down, depressed, or hopeless 0   Total Score 0       Objective   Physical " Exam  Vitals signs and nursing note reviewed.   Constitutional:       Appearance: Normal appearance. She is well-developed and normal weight.   HENT:      Head: Normocephalic and atraumatic.      Right Ear: External ear normal.      Left Ear: External ear normal.   Eyes:      Extraocular Movements: Extraocular movements intact.      Conjunctiva/sclera: Conjunctivae normal.   Neck:      Musculoskeletal: Neck supple.      Vascular: No carotid bruit.   Cardiovascular:      Rate and Rhythm: Normal rate and regular rhythm.      Heart sounds: Normal heart sounds.      Comments: No bruits  Pulmonary:      Effort: Pulmonary effort is normal. No respiratory distress.      Breath sounds: Normal breath sounds. No wheezing or rales.   Abdominal:      General: Bowel sounds are normal. There is no distension.      Palpations: Abdomen is soft. There is no mass.      Tenderness: There is no abdominal tenderness.   Lymphadenopathy:      Cervical: No cervical adenopathy.   Skin:     General: Skin is warm.   Neurological:      General: No focal deficit present.      Mental Status: She is alert and oriented to person, place, and time.   Psychiatric:         Mood and Affect: Mood normal.         Behavior: Behavior normal.         Thought Content: Thought content normal.         Judgment: Judgment normal.           Current Outpatient Medications:   •  Coenzyme Q10 (CO Q 10) 100 MG capsule, , Disp: , Rfl:   •  fluticasone (FLONASE) 50 MCG/ACT nasal spray, 2 sprays into the nostril(s) as directed by provider Daily., Disp: 1 bottle, Rfl: 3  •  levothyroxine (SYNTHROID, LEVOTHROID) 100 MCG tablet, Take 1 tablet by mouth Daily., Disp: 90 tablet, Rfl: 0  •  magnesium oxide (MAG-OX) 400 MG tablet, Take 1 tablet by mouth every night at bedtime., Disp: , Rfl:   •  estradiol (Yuvafem) 10 MCG tablet vaginal tablet, , Disp: , Rfl:   •  magnesium oxide (MAGOX) 400 (241.3 Mg) MG tablet tablet, Take 400 mg by mouth every night at bedtime., Disp: , Rfl:      Assessment/Plan   Diagnoses and all orders for this visit:    1. Hypothyroidism, unspecified type (Primary)  -     CBC & Differential  -     TSH  -     T4, Free  -     Basic Metabolic Panel    2. Polycythemia  -     CBC & Differential  -     TSH  -     T4, Free  -     Basic Metabolic Panel    3. Lightheadedness  -     CBC & Differential  -     TSH  -     T4, Free  -     Basic Metabolic Panel    4. Encounter for immunization  -     Cancel: Fluarix Quad >6 Months ([id number])    5. Epistaxis  -     Ambulatory Referral to ENT (Otolaryngology)    6. Sensation of chest pressure      Hydration and decrease caffeine   Monitor her BP at home and call if consistently greater than 135/85  Labs today  Weight loss and less salt and exercise to improve BP and help the lightheadedness as well.  Orthostatics show her blood pressure to be elevated while lying.  Her blood pressure is 148/84 with a pulse of 73.  With sitting, her blood pressure is 134/98 and a pulse of 86.  Standing, 134/82 with a heart rate of 85.  She states that she drinks 15 to 16 ounces of coffee and then about 90 ounces of decaffeinated tea per day with very little water or other form of hydration.  It is possible that the caffeine and the lack of free water could be causing some orthostasis.  I have asked her to decrease her caffeine intake and increase her water and/or low sugar Gatorade.  I am also going to recheck her thyroid as we did decrease her dosage 3 months ago from 112 mcg to 100 mcg daily.  Is quite possible that this could be an issue.  I am also going to check her kidneys and electrolytes as well as a CBC.  Her EKG today looks beautiful totally normal.  If she persists with some of the some symptoms of chest bubble pressure sensation, she will let me know.  We will pursue further work-up at that time.  She has no exertional symptoms.  She has an area in her left upper nostril that is been there for 30+ years.  This area occasionally will feel  a fullness and then a clot will dislodge and she occasionally will have some bleeding.  Iwill have her see Dr. Michael Casiano for further evaluation of this area.  It is not something that I am able to see on exam today.  I do recommend that she restart her magnesium and co-Q10 supplements since they have helped her headaches in the past.  Consider head CT or MRI brain if her symptoms persist to stop bite making the good changes already discussed         I have asked for the patient to return to clinic in 6month(s).

## 2020-10-16 NOTE — PROGRESS NOTES
Procedure     ECG 12 Lead    Date/Time: 10/16/2020 3:16 PM  Performed by: Ellyn Martinez MD  Authorized by: Ellyn Martinez MD   Comparison: compared with previous ECG   Similar to previous ECG  Rhythm: sinus rhythm  Rate: normal  Conduction: conduction normal  ST Segments: ST segments normal  T Waves: T waves normal  QRS axis: normal    Clinical impression: normal ECG

## 2020-10-17 LAB
BASOPHILS # BLD AUTO: 0.08 10*3/MM3 (ref 0–0.2)
BASOPHILS NFR BLD AUTO: 1.3 % (ref 0–1.5)
BUN SERPL-MCNC: 14 MG/DL (ref 6–20)
BUN/CREAT SERPL: 24.6 (ref 7–25)
CALCIUM SERPL-MCNC: 10 MG/DL (ref 8.6–10.5)
CHLORIDE SERPL-SCNC: 100 MMOL/L (ref 98–107)
CO2 SERPL-SCNC: 29.1 MMOL/L (ref 22–29)
CREAT SERPL-MCNC: 0.57 MG/DL (ref 0.57–1)
EOSINOPHIL # BLD AUTO: 0.12 10*3/MM3 (ref 0–0.4)
EOSINOPHIL NFR BLD AUTO: 1.9 % (ref 0.3–6.2)
ERYTHROCYTE [DISTWIDTH] IN BLOOD BY AUTOMATED COUNT: 12.8 % (ref 12.3–15.4)
GLUCOSE SERPL-MCNC: 76 MG/DL (ref 65–99)
HCT VFR BLD AUTO: 47.1 % (ref 34–46.6)
HGB BLD-MCNC: 15.8 G/DL (ref 12–15.9)
IMM GRANULOCYTES # BLD AUTO: 0.02 10*3/MM3 (ref 0–0.05)
IMM GRANULOCYTES NFR BLD AUTO: 0.3 % (ref 0–0.5)
LYMPHOCYTES # BLD AUTO: 2.19 10*3/MM3 (ref 0.7–3.1)
LYMPHOCYTES NFR BLD AUTO: 35.1 % (ref 19.6–45.3)
MCH RBC QN AUTO: 29.7 PG (ref 26.6–33)
MCHC RBC AUTO-ENTMCNC: 33.5 G/DL (ref 31.5–35.7)
MCV RBC AUTO: 88.5 FL (ref 79–97)
MONOCYTES # BLD AUTO: 0.64 10*3/MM3 (ref 0.1–0.9)
MONOCYTES NFR BLD AUTO: 10.3 % (ref 5–12)
NEUTROPHILS # BLD AUTO: 3.19 10*3/MM3 (ref 1.7–7)
NEUTROPHILS NFR BLD AUTO: 51.1 % (ref 42.7–76)
NRBC BLD AUTO-RTO: 0 /100 WBC (ref 0–0.2)
PLATELET # BLD AUTO: 255 10*3/MM3 (ref 140–450)
POTASSIUM SERPL-SCNC: 4.4 MMOL/L (ref 3.5–5.2)
RBC # BLD AUTO: 5.32 10*6/MM3 (ref 3.77–5.28)
SODIUM SERPL-SCNC: 138 MMOL/L (ref 136–145)
T4 FREE SERPL-MCNC: 1.68 NG/DL (ref 0.93–1.7)
TSH SERPL DL<=0.005 MIU/L-ACNC: 1.82 UIU/ML (ref 0.27–4.2)
WBC # BLD AUTO: 6.24 10*3/MM3 (ref 3.4–10.8)

## 2020-10-20 ENCOUNTER — RESULTS ENCOUNTER (OUTPATIENT)
Dept: FAMILY MEDICINE CLINIC | Facility: CLINIC | Age: 55
End: 2020-10-20

## 2020-10-20 DIAGNOSIS — E03.9 HYPOTHYROIDISM, UNSPECIFIED TYPE: ICD-10-CM

## 2020-10-20 DIAGNOSIS — E78.5 HYPERLIPIDEMIA, UNSPECIFIED HYPERLIPIDEMIA TYPE: ICD-10-CM

## 2020-10-28 RX ORDER — LEVOTHYROXINE SODIUM 0.1 MG/1
TABLET ORAL
Qty: 90 TABLET | Refills: 0 | Status: SHIPPED | OUTPATIENT
Start: 2020-10-28 | End: 2021-01-11

## 2021-01-11 RX ORDER — LEVOTHYROXINE SODIUM 0.1 MG/1
TABLET ORAL
Qty: 90 TABLET | Refills: 0 | Status: SHIPPED | OUTPATIENT
Start: 2021-01-11 | End: 2021-04-28

## 2021-01-28 ENCOUNTER — TRANSCRIBE ORDERS (OUTPATIENT)
Dept: LAB | Facility: SURGERY CENTER | Age: 56
End: 2021-01-28

## 2021-01-28 DIAGNOSIS — Z01.818 OTHER SPECIFIED PRE-OPERATIVE EXAMINATION: Primary | ICD-10-CM

## 2021-02-12 RX ORDER — LANSOPRAZOLE 15 MG/1
15 CAPSULE, DELAYED RELEASE ORAL DAILY
COMMUNITY
End: 2021-08-04

## 2021-02-12 RX ORDER — MULTIPLE VITAMINS W/ MINERALS TAB 9MG-400MCG
1 TAB ORAL DAILY
COMMUNITY
End: 2021-02-16 | Stop reason: HOSPADM

## 2021-02-13 ENCOUNTER — LAB (OUTPATIENT)
Dept: LAB | Facility: SURGERY CENTER | Age: 56
End: 2021-02-13

## 2021-02-13 DIAGNOSIS — Z01.818 OTHER SPECIFIED PRE-OPERATIVE EXAMINATION: ICD-10-CM

## 2021-02-13 LAB — SARS-COV-2 ORF1AB RESP QL NAA+PROBE: NOT DETECTED

## 2021-02-13 PROCEDURE — U0004 COV-19 TEST NON-CDC HGH THRU: HCPCS

## 2021-02-13 PROCEDURE — C9803 HOPD COVID-19 SPEC COLLECT: HCPCS

## 2021-02-16 ENCOUNTER — HOSPITAL ENCOUNTER (OUTPATIENT)
Facility: SURGERY CENTER | Age: 56
Setting detail: HOSPITAL OUTPATIENT SURGERY
Discharge: HOME | End: 2021-02-16
Attending: OTOLARYNGOLOGY | Admitting: OTOLARYNGOLOGY

## 2021-02-16 ENCOUNTER — ANESTHESIA EVENT (OUTPATIENT)
Dept: SURGERY | Facility: SURGERY CENTER | Age: 56
End: 2021-02-16

## 2021-02-16 ENCOUNTER — ANESTHESIA (OUTPATIENT)
Dept: SURGERY | Facility: SURGERY CENTER | Age: 56
End: 2021-02-16

## 2021-02-16 VITALS
OXYGEN SATURATION: 98 % | RESPIRATION RATE: 12 BRPM | SYSTOLIC BLOOD PRESSURE: 135 MMHG | DIASTOLIC BLOOD PRESSURE: 87 MMHG | HEART RATE: 80 BPM | WEIGHT: 155.87 LBS | HEIGHT: 61 IN | BODY MASS INDEX: 29.43 KG/M2 | TEMPERATURE: 97.7 F

## 2021-02-16 DIAGNOSIS — J34.2 DEVIATED NASAL SEPTUM: Primary | ICD-10-CM

## 2021-02-16 PROCEDURE — 25010000002 DEXAMETHASONE PER 1 MG: Performed by: ANESTHESIOLOGY

## 2021-02-16 PROCEDURE — 25010000002 FENTANYL CITRATE (PF) 100 MCG/2ML SOLUTION: Performed by: ANESTHESIOLOGY

## 2021-02-16 PROCEDURE — 25010000002 ONDANSETRON PER 1 MG: Performed by: ANESTHESIOLOGY

## 2021-02-16 PROCEDURE — 30930 THER FX NASAL INF TURBINATE: CPT | Performed by: OTOLARYNGOLOGY

## 2021-02-16 PROCEDURE — 30520 REPAIR OF NASAL SEPTUM: CPT | Performed by: OTOLARYNGOLOGY

## 2021-02-16 PROCEDURE — 25010000002 SUCCINYLCHOLINE PER 20 MG: Performed by: ANESTHESIOLOGY

## 2021-02-16 PROCEDURE — 88304 TISSUE EXAM BY PATHOLOGIST: CPT | Performed by: OTOLARYNGOLOGY

## 2021-02-16 PROCEDURE — 25010000002 FENTANYL CITRATE (PF) 250 MCG/5ML SOLUTION: Performed by: ANESTHESIOLOGY

## 2021-02-16 PROCEDURE — 099M3ZZ DRAINAGE OF NASAL SEPTUM, PERCUTANEOUS APPROACH: ICD-10-PCS | Performed by: OTOLARYNGOLOGY

## 2021-02-16 PROCEDURE — 25010000002 PROPOFOL 10 MG/ML EMULSION: Performed by: ANESTHESIOLOGY

## 2021-02-16 RX ORDER — LIDOCAINE HYDROCHLORIDE 20 MG/ML
INJECTION, SOLUTION INFILTRATION; PERINEURAL AS NEEDED
Status: DISCONTINUED | OUTPATIENT
Start: 2021-02-16 | End: 2021-02-16 | Stop reason: SURG

## 2021-02-16 RX ORDER — FENTANYL CITRATE 50 UG/ML
INJECTION, SOLUTION INTRAMUSCULAR; INTRAVENOUS AS NEEDED
Status: DISCONTINUED | OUTPATIENT
Start: 2021-02-16 | End: 2021-02-16 | Stop reason: SURG

## 2021-02-16 RX ORDER — FENTANYL CITRATE 50 UG/ML
50 INJECTION, SOLUTION INTRAMUSCULAR; INTRAVENOUS
Status: DISCONTINUED | OUTPATIENT
Start: 2021-02-16 | End: 2021-02-16 | Stop reason: HOSPADM

## 2021-02-16 RX ORDER — SODIUM CHLORIDE 0.9 % (FLUSH) 0.9 %
10 SYRINGE (ML) INJECTION AS NEEDED
Status: DISCONTINUED | OUTPATIENT
Start: 2021-02-16 | End: 2021-02-16 | Stop reason: HOSPADM

## 2021-02-16 RX ORDER — GINSENG 100 MG
CAPSULE ORAL AS NEEDED
Status: DISCONTINUED | OUTPATIENT
Start: 2021-02-16 | End: 2021-02-16 | Stop reason: HOSPADM

## 2021-02-16 RX ORDER — LIDOCAINE HYDROCHLORIDE AND EPINEPHRINE 10; 10 MG/ML; UG/ML
INJECTION, SOLUTION INFILTRATION; PERINEURAL AS NEEDED
Status: DISCONTINUED | OUTPATIENT
Start: 2021-02-16 | End: 2021-02-16 | Stop reason: HOSPADM

## 2021-02-16 RX ORDER — HYDROCODONE BITARTRATE AND ACETAMINOPHEN 5; 325 MG/1; MG/1
1 TABLET ORAL EVERY 4 HOURS PRN
Status: DISCONTINUED | OUTPATIENT
Start: 2021-02-16 | End: 2021-02-16 | Stop reason: HOSPADM

## 2021-02-16 RX ORDER — LIDOCAINE HYDROCHLORIDE 10 MG/ML
0.5 INJECTION, SOLUTION INFILTRATION; PERINEURAL ONCE AS NEEDED
Status: DISCONTINUED | OUTPATIENT
Start: 2021-02-16 | End: 2021-02-16 | Stop reason: HOSPADM

## 2021-02-16 RX ORDER — DIPHENHYDRAMINE HYDROCHLORIDE 50 MG/ML
12.5 INJECTION INTRAMUSCULAR; INTRAVENOUS
Status: DISCONTINUED | OUTPATIENT
Start: 2021-02-16 | End: 2021-02-16 | Stop reason: HOSPADM

## 2021-02-16 RX ORDER — HYDROMORPHONE HYDROCHLORIDE 1 MG/ML
0.5 INJECTION, SOLUTION INTRAMUSCULAR; INTRAVENOUS; SUBCUTANEOUS
Status: DISCONTINUED | OUTPATIENT
Start: 2021-02-16 | End: 2021-02-16 | Stop reason: HOSPADM

## 2021-02-16 RX ORDER — SUCCINYLCHOLINE CHLORIDE 20 MG/ML
INJECTION INTRAMUSCULAR; INTRAVENOUS AS NEEDED
Status: DISCONTINUED | OUTPATIENT
Start: 2021-02-16 | End: 2021-02-16 | Stop reason: SURG

## 2021-02-16 RX ORDER — SODIUM CHLORIDE, SODIUM LACTATE, POTASSIUM CHLORIDE, CALCIUM CHLORIDE 600; 310; 30; 20 MG/100ML; MG/100ML; MG/100ML; MG/100ML
1000 INJECTION, SOLUTION INTRAVENOUS CONTINUOUS
Status: DISCONTINUED | OUTPATIENT
Start: 2021-02-16 | End: 2021-02-16 | Stop reason: HOSPADM

## 2021-02-16 RX ORDER — LIDOCAINE HYDROCHLORIDE 40 MG/ML
SOLUTION TOPICAL AS NEEDED
Status: DISCONTINUED | OUTPATIENT
Start: 2021-02-16 | End: 2021-02-16 | Stop reason: SURG

## 2021-02-16 RX ORDER — ONDANSETRON 2 MG/ML
INJECTION INTRAMUSCULAR; INTRAVENOUS AS NEEDED
Status: DISCONTINUED | OUTPATIENT
Start: 2021-02-16 | End: 2021-02-16 | Stop reason: SURG

## 2021-02-16 RX ORDER — SODIUM CHLORIDE, SODIUM LACTATE, POTASSIUM CHLORIDE, CALCIUM CHLORIDE 600; 310; 30; 20 MG/100ML; MG/100ML; MG/100ML; MG/100ML
9 INJECTION, SOLUTION INTRAVENOUS CONTINUOUS
Status: DISCONTINUED | OUTPATIENT
Start: 2021-02-16 | End: 2021-02-16

## 2021-02-16 RX ORDER — CHLORAL HYDRATE 500 MG
1000 CAPSULE ORAL
COMMUNITY
End: 2021-02-16 | Stop reason: HOSPADM

## 2021-02-16 RX ORDER — HYDROCODONE BITARTRATE AND ACETAMINOPHEN 5; 325 MG/1; MG/1
1-2 TABLET ORAL EVERY 4 HOURS PRN
Qty: 30 TABLET | Refills: 0 | Status: SHIPPED | OUTPATIENT
Start: 2021-02-16 | End: 2021-08-04

## 2021-02-16 RX ORDER — OXYMETAZOLINE HYDROCHLORIDE 0.05 G/100ML
SPRAY NASAL AS NEEDED
Status: DISCONTINUED | OUTPATIENT
Start: 2021-02-16 | End: 2021-02-16 | Stop reason: HOSPADM

## 2021-02-16 RX ORDER — NALOXONE HCL 0.4 MG/ML
0.2 VIAL (ML) INJECTION AS NEEDED
Status: DISCONTINUED | OUTPATIENT
Start: 2021-02-16 | End: 2021-02-16 | Stop reason: HOSPADM

## 2021-02-16 RX ORDER — DIPHENHYDRAMINE HCL 25 MG
25 TABLET ORAL
Status: DISCONTINUED | OUTPATIENT
Start: 2021-02-16 | End: 2021-02-16 | Stop reason: HOSPADM

## 2021-02-16 RX ORDER — MIDAZOLAM HYDROCHLORIDE 1 MG/ML
1 INJECTION INTRAMUSCULAR; INTRAVENOUS
Status: DISCONTINUED | OUTPATIENT
Start: 2021-02-16 | End: 2021-02-16 | Stop reason: HOSPADM

## 2021-02-16 RX ORDER — MULTIVIT WITH MINERALS/LUTEIN
1000 TABLET ORAL DAILY
COMMUNITY
End: 2021-08-04

## 2021-02-16 RX ORDER — MAGNESIUM HYDROXIDE 1200 MG/15ML
LIQUID ORAL AS NEEDED
Status: DISCONTINUED | OUTPATIENT
Start: 2021-02-16 | End: 2021-02-16 | Stop reason: HOSPADM

## 2021-02-16 RX ORDER — PROMETHAZINE HYDROCHLORIDE 25 MG/1
25 TABLET ORAL EVERY 6 HOURS PRN
Qty: 20 TABLET | Refills: 0 | Status: SHIPPED | OUTPATIENT
Start: 2021-02-16 | End: 2021-08-04

## 2021-02-16 RX ORDER — DEXAMETHASONE SODIUM PHOSPHATE 4 MG/ML
INJECTION, SOLUTION INTRA-ARTICULAR; INTRALESIONAL; INTRAMUSCULAR; INTRAVENOUS; SOFT TISSUE AS NEEDED
Status: DISCONTINUED | OUTPATIENT
Start: 2021-02-16 | End: 2021-02-16 | Stop reason: SURG

## 2021-02-16 RX ORDER — PROPOFOL 10 MG/ML
VIAL (ML) INTRAVENOUS AS NEEDED
Status: DISCONTINUED | OUTPATIENT
Start: 2021-02-16 | End: 2021-02-16 | Stop reason: SURG

## 2021-02-16 RX ORDER — MIDAZOLAM HYDROCHLORIDE 1 MG/ML
2 INJECTION INTRAMUSCULAR; INTRAVENOUS
Status: DISCONTINUED | OUTPATIENT
Start: 2021-02-16 | End: 2021-02-16 | Stop reason: HOSPADM

## 2021-02-16 RX ORDER — DOXYCYCLINE HYCLATE 100 MG/1
100 TABLET, DELAYED RELEASE ORAL 2 TIMES DAILY
Qty: 14 TABLET | Refills: 0 | Status: SHIPPED | OUTPATIENT
Start: 2021-02-16 | End: 2021-02-23

## 2021-02-16 RX ORDER — FLUMAZENIL 0.1 MG/ML
0.2 INJECTION INTRAVENOUS AS NEEDED
Status: DISCONTINUED | OUTPATIENT
Start: 2021-02-16 | End: 2021-02-16 | Stop reason: HOSPADM

## 2021-02-16 RX ORDER — OXYMETAZOLINE HYDROCHLORIDE 0.05 G/100ML
2 SPRAY NASAL 2 TIMES DAILY
Status: DISCONTINUED | OUTPATIENT
Start: 2021-02-16 | End: 2021-02-16 | Stop reason: HOSPADM

## 2021-02-16 RX ADMIN — Medication 2 SPRAY: at 09:03

## 2021-02-16 RX ADMIN — PROPOFOL 40 MG: 10 INJECTION, EMULSION INTRAVENOUS at 09:41

## 2021-02-16 RX ADMIN — LIDOCAINE HYDROCHLORIDE 1 EACH: 40 SOLUTION TOPICAL at 09:40

## 2021-02-16 RX ADMIN — Medication 2 SPRAY: at 07:45

## 2021-02-16 RX ADMIN — ONDANSETRON 4 MG: 2 SOLUTION INTRAMUSCULAR; INTRAVENOUS at 10:32

## 2021-02-16 RX ADMIN — PROPOFOL 160 MG: 10 INJECTION, EMULSION INTRAVENOUS at 09:38

## 2021-02-16 RX ADMIN — FENTANYL CITRATE 50 MCG: 50 INJECTION, SOLUTION INTRAMUSCULAR; INTRAVENOUS at 09:38

## 2021-02-16 RX ADMIN — SODIUM CHLORIDE, POTASSIUM CHLORIDE, SODIUM LACTATE AND CALCIUM CHLORIDE 1000 ML: 600; 310; 30; 20 INJECTION, SOLUTION INTRAVENOUS at 07:31

## 2021-02-16 RX ADMIN — FENTANYL CITRATE 50 MCG: 50 INJECTION, SOLUTION INTRAMUSCULAR; INTRAVENOUS at 09:33

## 2021-02-16 RX ADMIN — FENTANYL CITRATE 50 MCG: 50 INJECTION, SOLUTION INTRAMUSCULAR; INTRAVENOUS at 11:20

## 2021-02-16 RX ADMIN — SUCCINYLCHOLINE CHLORIDE 120 MG: 20 INJECTION, SOLUTION INTRAMUSCULAR; INTRAVENOUS at 09:39

## 2021-02-16 RX ADMIN — DEXAMETHASONE SODIUM PHOSPHATE 8 MG: 4 INJECTION, SOLUTION INTRAMUSCULAR; INTRAVENOUS at 09:43

## 2021-02-16 RX ADMIN — FAMOTIDINE 20 MG: 10 INJECTION INTRAVENOUS at 08:11

## 2021-02-16 RX ADMIN — HYDROCODONE BITARTRATE AND ACETAMINOPHEN 1 TABLET: 5; 325 TABLET ORAL at 11:42

## 2021-02-16 RX ADMIN — LIDOCAINE HYDROCHLORIDE 70 MG: 20 INJECTION, SOLUTION INFILTRATION; PERINEURAL at 09:38

## 2021-02-16 NOTE — ANESTHESIA PREPROCEDURE EVALUATION
Anesthesia Evaluation     Patient summary reviewed   no history of anesthetic complications:  NPO Solid Status: > 8 hours  NPO Liquid Status: > 2 hours           Airway   Mallampati: II  TM distance: >3 FB  Neck ROM: full  No difficulty expected  Dental - normal exam     Pulmonary     breath sounds clear to auscultation  (-) shortness of breath  Cardiovascular   Exercise tolerance: good (4-7 METS)    Rhythm: regular  Rate: normal    (+) hyperlipidemia,   (-) past MI, angina, orthopnea, ARORA, cardiac stents, CABG      Neuro/Psych  (+) headaches,     (-) seizures, neuromuscular disease, TIA, CVA  GI/Hepatic/Renal/Endo    (+)   thyroid problem hypothyroidism  (-)  obesity, no renal disease, diabetes    Musculoskeletal     (-) neck stiffness  Abdominal    Substance History      OB/GYN          Other                        Anesthesia Plan    ASA 2     general     intravenous induction     Anesthetic plan, all risks, benefits, and alternatives have been provided, discussed and informed consent has been obtained with: patient.

## 2021-02-16 NOTE — DISCHARGE INSTRUCTIONS
Dr Maravilla / Dr Casiano Septoplasty Discharge Instructions            Today, you will be given the following instructions to help you and your family prepare for your discharge.  You are a very important part of your recovery.  Ask your Nurse or Physician any     Questions.      Septoplasty Discharge Instructions :            *  Your surgery will repair the bones or cartilage in your nose to relieve nasal obstruction.             *  Expect pain and swelling to slowly decrease over the next week.    You will be able to breath through your nose and your sense of smell will return after the swelling goes down.        Follow these instructions:              1.  Keep the head of your bed elevated to help the discharge            2.  AVOID strenuous activity for the next 3-4 days              3.  Do NOT blow your nose until okay with your doctor            4.  Change the nasal drip as needed            5.  Put ice packs on your face to keep swelling down            6.  Use a cool mist vaporizer in your house to humidify air            7.  Sneeze with your mouth open            8.  Avoid smoke and irritants            9.  Take your medicine as instructed        Call your doctor if you have:            *  Foul smell from drainage            *  Chills or fever over 101 degrees by mouth            *  Pain not helped by your pain medicine            *  Trouble or pain with breathing            *  Any problems or concerns        Dr Maravilla and Dr Casiano  [phone]

## 2021-02-16 NOTE — ANESTHESIA POSTPROCEDURE EVALUATION
"Patient: Yomaira Ball    Procedure Summary     Date: 02/16/21 Room / Location: [facility]    Anesthesia Start: 0932 Anesthesia Stop: 1047    Procedure: SEPTOPLASTY, RESECTION INFERIOR TURBINATES (Bilateral Nose) Diagnosis:       Deviated nasal septum      (Deviated nasal septum [J34.2])    Surgeon: Michael Casiano MD Provider: Eze Bourne DO    Anesthesia Type: general ASA Status: 2          Anesthesia Type: general    Vitals  Vitals Value Taken Time   /35 02/16/21 1131   Temp     Pulse 86 02/16/21 1136   Resp 16 02/16/21 1130   SpO2 96 % 02/16/21 1136   Vitals shown include unvalidated device data.        Post Anesthesia Care and Evaluation    Patient location during evaluation: bedside  Patient participation: complete - patient participated  Level of consciousness: awake and alert  Pain management: satisfactory to patient  Airway patency: patent  Anesthetic complications: No anesthetic complications  PONV Status: none  Cardiovascular status: acceptable and hemodynamically stable  Respiratory status: acceptable  Hydration status: acceptable    Comments: /87 (BP Location: Left arm, Patient Position: Lying)   Pulse 87   Temp 36.5 °C (97.7 °F) (Tympanic)   Resp 16   Ht 154.9 cm (61\")   Wt 70.7 kg (155 lb 13.8 oz)   LMP  (LMP Unknown)   SpO2 96%   BMI 29.45 kg/m²         "

## 2021-02-16 NOTE — ANESTHESIA PROCEDURE NOTES
Airway  Urgency: elective    Date/Time: 2/16/2021 9:40 AM  End Time:2/16/2021 9:41 AM  Airway not difficult    General Information and Staff    Patient location during procedure: OR  Anesthesiologist: Eze Buorne DO    Indications and Patient Condition  Indications for airway management: airway protection    Preoxygenated: yes  MILS maintained throughout  Mask difficulty assessment: 2 - vent by mask + OA or adjuvant +/- NMBA    Final Airway Details  Final airway type: endotracheal airway      Successful airway: ETT  Cuffed: yes   Successful intubation technique: direct laryngoscopy  Facilitating devices/methods: anterior pressure/BURP  Endotracheal tube insertion site: oral  Blade: Shila  Blade size: 3  ETT size (mm): 7.0  Cormack-Lehane Classification: grade IIa - partial view of glottis  Placement verified by: chest auscultation and capnometry   Cuff volume (mL): 8  Measured from: teeth  ETT/EBT  to teeth (cm): 20  Number of attempts at approach: 1  Assessment: lips, teeth, and gum same as pre-op and atraumatic intubation

## 2021-02-16 NOTE — OP NOTE
SEPTOPLASTY, RESECTION INFERIOR TURBINATES  Progress Note    Yomaira Ball  2/16/2021    Pre-op Diagnosis:   Deviated nasal septum [J34.2]       Post-Op Diagnosis Codes:     * Deviated nasal septum [J34.2]    Procedure/CPT® Codes:        Procedure(s):  SEPTOPLASTY, BILATERAL INFERIOR TURBINATE REDUCTION    Surgeon(s):  Michael Casiano MD    Anesthesia: General    Staff:   Circulator: Jacinta Kiran RN  Scrub Person: Ellie Pink         Estimated Blood Loss: 30 ml    Urine Voided: * No values recorded between 2/16/2021  9:31 AM and 2/16/2021 10:36 AM *    Specimens:                Specimens     ID Source Type Tests Collected By Collected At Frozen?      A Nasal turbinate, inferior Tissue · TISSUE PATHOLOGY EXAM   Michael Casiano MD 2/16/21 0959 No     Description: septal cartilage and bone, bilateral inferior turbinate trimmings    Comment: septal cartilage and bone, bilateral inferior turbinate trimmings                Drains: * No LDAs found *    Findings: severe deviation of nasal septum to left. Devitalization of quadrangular cartilage anteriorly. Maxillary crest taken down as quadrangular cartilage had shifted off the crest to the left. Both inferior turbinates reduced approximately 20%    Complications: none    OPERATIVE REPORT: The patient was taken to the operating room placed in the supine position and placed under general endotracheal anesthesia.  The nose was prepped in the usual fashion with 1% lidocaine with epinephrine injections followed by the placement of Afrin-soaked pledgets.  After allowing time for vasoconstriction to occur, a left hemitransfixion incision was made followed by elevation of the subperichondrial and subperiosteal flap.  An area of devitalization of the quadrangular cartilage was present making flap elevation more difficult. The caudal aspect of the cartilage did have slight tearing that required reapproximation and fixation with 6-0 PDS suture. The cartilage knife was  used to score the cartilage more posteriorly and superiorly and the contralateral subperichondrial and subperiosteal flap was elevated.  As much as possible of the quadrangular cartilage was left for tip support superiorly especially with the caudal aspect being very thinned.  The crooked portions of the bony and cartilaginous septum more posteriorly were removed in order to straighten the nasal passage.  The maxillary crest was taken down with the osteotome as the cartilage had been displace off the crest to the left.  The nasal passages were reexamined and noted to be dramatically improved.  Irrigation of the operative site was performed.   The hemitransfixion incision was closed with a 4-0 chromic suture.  A 4-0 plain gut suture was then used to perform a quilting stitch.  Attention was then turned to the inferior turbinates.  The freer elevator was used to infracture the lower border of the turbinate followed by placement of a Florencio bowel clamp.  A more proximal clamp was placed and removed after 30 seconds.  The portion of the turbinate contained within the distal bowel clamp was trimmed using the turbinate scissors accomplishing about a 20% reduction on each side.  The turbinate was then relateralized with the Bois elevator.  Price splints were sewn into place anteriorly along with Telfa packs.  At this point the procedure was complete and the patient was allowed to awaken from anesthesia and was taken to the recovery room in satisfactory condition.                Michael Casiano MD     Date: 2/16/2021  Time: 10:37 EST

## 2021-02-17 LAB
LAB AP CASE REPORT: NORMAL
LAB AP CLINICAL INFORMATION: NORMAL
PATH REPORT.FINAL DX SPEC: NORMAL
PATH REPORT.GROSS SPEC: NORMAL

## 2021-03-26 ENCOUNTER — BULK ORDERING (OUTPATIENT)
Dept: CASE MANAGEMENT | Facility: OTHER | Age: 56
End: 2021-03-26

## 2021-03-26 DIAGNOSIS — Z23 IMMUNIZATION DUE: ICD-10-CM

## 2021-04-28 RX ORDER — LEVOTHYROXINE SODIUM 0.1 MG/1
TABLET ORAL
Qty: 90 TABLET | Refills: 0 | Status: SHIPPED | OUTPATIENT
Start: 2021-04-28 | End: 2021-08-05

## 2021-06-30 ENCOUNTER — APPOINTMENT (OUTPATIENT)
Dept: WOMENS IMAGING | Facility: HOSPITAL | Age: 56
End: 2021-06-30

## 2021-06-30 PROCEDURE — 77066 DX MAMMO INCL CAD BI: CPT | Performed by: RADIOLOGY

## 2021-06-30 PROCEDURE — 77062 BREAST TOMOSYNTHESIS BI: CPT | Performed by: RADIOLOGY

## 2021-06-30 PROCEDURE — G0279 TOMOSYNTHESIS, MAMMO: HCPCS | Performed by: RADIOLOGY

## 2021-06-30 PROCEDURE — 76641 ULTRASOUND BREAST COMPLETE: CPT | Performed by: RADIOLOGY

## 2021-07-29 DIAGNOSIS — E55.9 VITAMIN D DEFICIENCY: ICD-10-CM

## 2021-07-29 DIAGNOSIS — Z00.00 WELL ADULT EXAM: Primary | ICD-10-CM

## 2021-07-31 LAB
25(OH)D3+25(OH)D2 SERPL-MCNC: 35.1 NG/ML (ref 30–100)
ALBUMIN SERPL-MCNC: 4.6 G/DL (ref 3.5–5.2)
ALBUMIN/GLOB SERPL: 2.2 G/DL
ALP SERPL-CCNC: 72 U/L (ref 39–117)
ALT SERPL-CCNC: 33 U/L (ref 1–33)
APPEARANCE UR: CLEAR
AST SERPL-CCNC: 18 U/L (ref 1–32)
BACTERIA #/AREA URNS HPF: NORMAL /HPF
BASOPHILS # BLD AUTO: 0.08 10*3/MM3 (ref 0–0.2)
BASOPHILS NFR BLD AUTO: 1.2 % (ref 0–1.5)
BILIRUB SERPL-MCNC: 2 MG/DL (ref 0–1.2)
BILIRUB UR QL STRIP: NEGATIVE
BUN SERPL-MCNC: 12 MG/DL (ref 6–20)
BUN/CREAT SERPL: 18.5 (ref 7–25)
CALCIUM SERPL-MCNC: 9.9 MG/DL (ref 8.6–10.5)
CASTS URNS QL MICRO: NORMAL /LPF
CHLORIDE SERPL-SCNC: 103 MMOL/L (ref 98–107)
CHOLEST SERPL-MCNC: 175 MG/DL (ref 0–200)
CO2 SERPL-SCNC: 25.8 MMOL/L (ref 22–29)
COLOR UR: YELLOW
CREAT SERPL-MCNC: 0.65 MG/DL (ref 0.57–1)
EOSINOPHIL # BLD AUTO: 0.15 10*3/MM3 (ref 0–0.4)
EOSINOPHIL NFR BLD AUTO: 2.2 % (ref 0.3–6.2)
EPI CELLS #/AREA URNS HPF: NORMAL /HPF (ref 0–10)
ERYTHROCYTE [DISTWIDTH] IN BLOOD BY AUTOMATED COUNT: 13.5 % (ref 12.3–15.4)
GLOBULIN SER CALC-MCNC: 2.1 GM/DL
GLUCOSE SERPL-MCNC: 83 MG/DL (ref 65–99)
GLUCOSE UR QL: NEGATIVE
HCT VFR BLD AUTO: 48.5 % (ref 34–46.6)
HDLC SERPL-MCNC: 44 MG/DL (ref 40–60)
HGB BLD-MCNC: 15.6 G/DL (ref 12–15.9)
HGB UR QL STRIP: NEGATIVE
IMM GRANULOCYTES # BLD AUTO: 0.02 10*3/MM3 (ref 0–0.05)
IMM GRANULOCYTES NFR BLD AUTO: 0.3 % (ref 0–0.5)
KETONES UR QL STRIP: NEGATIVE
LDLC SERPL CALC-MCNC: 111 MG/DL (ref 0–100)
LDLC/HDLC SERPL: 2.48 {RATIO}
LEUKOCYTE ESTERASE UR QL STRIP: NEGATIVE
LYMPHOCYTES # BLD AUTO: 2.63 10*3/MM3 (ref 0.7–3.1)
LYMPHOCYTES NFR BLD AUTO: 38.2 % (ref 19.6–45.3)
MCH RBC QN AUTO: 29.1 PG (ref 26.6–33)
MCHC RBC AUTO-ENTMCNC: 32.2 G/DL (ref 31.5–35.7)
MCV RBC AUTO: 90.3 FL (ref 79–97)
MICRO URNS: NORMAL
MICRO URNS: NORMAL
MONOCYTES # BLD AUTO: 0.61 10*3/MM3 (ref 0.1–0.9)
MONOCYTES NFR BLD AUTO: 8.9 % (ref 5–12)
NEUTROPHILS # BLD AUTO: 3.4 10*3/MM3 (ref 1.7–7)
NEUTROPHILS NFR BLD AUTO: 49.2 % (ref 42.7–76)
NITRITE UR QL STRIP: NEGATIVE
NRBC BLD AUTO-RTO: 0 /100 WBC (ref 0–0.2)
PH UR STRIP: 6.5 [PH] (ref 5–7.5)
PLATELET # BLD AUTO: 228 10*3/MM3 (ref 140–450)
POTASSIUM SERPL-SCNC: 4.6 MMOL/L (ref 3.5–5.2)
PROT SERPL-MCNC: 6.7 G/DL (ref 6–8.5)
PROT UR QL STRIP: NEGATIVE
RBC # BLD AUTO: 5.37 10*6/MM3 (ref 3.77–5.28)
RBC #/AREA URNS HPF: NORMAL /HPF (ref 0–2)
SODIUM SERPL-SCNC: 141 MMOL/L (ref 136–145)
SP GR UR: 1.01 (ref 1–1.03)
T4 FREE SERPL-MCNC: 1.62 NG/DL (ref 0.93–1.7)
TRIGL SERPL-MCNC: 109 MG/DL (ref 0–150)
TSH SERPL DL<=0.005 MIU/L-ACNC: 1.09 UIU/ML (ref 0.27–4.2)
URINALYSIS REFLEX: NORMAL
UROBILINOGEN UR STRIP-MCNC: 0.2 MG/DL (ref 0.2–1)
VLDLC SERPL CALC-MCNC: 20 MG/DL (ref 5–40)
WBC # BLD AUTO: 6.89 10*3/MM3 (ref 3.4–10.8)
WBC #/AREA URNS HPF: NORMAL /HPF (ref 0–5)

## 2021-08-03 DIAGNOSIS — R73.09 ELEVATED GLUCOSE: Primary | ICD-10-CM

## 2021-08-04 ENCOUNTER — OFFICE VISIT (OUTPATIENT)
Dept: FAMILY MEDICINE CLINIC | Facility: CLINIC | Age: 56
End: 2021-08-04

## 2021-08-04 VITALS
HEART RATE: 74 BPM | HEIGHT: 61 IN | RESPIRATION RATE: 16 BRPM | OXYGEN SATURATION: 98 % | BODY MASS INDEX: 26.62 KG/M2 | SYSTOLIC BLOOD PRESSURE: 124 MMHG | TEMPERATURE: 96.8 F | DIASTOLIC BLOOD PRESSURE: 82 MMHG | WEIGHT: 141 LBS

## 2021-08-04 DIAGNOSIS — E03.9 HYPOTHYROIDISM, UNSPECIFIED TYPE: ICD-10-CM

## 2021-08-04 DIAGNOSIS — M67.449 DIGITAL MUCOUS CYST OF FINGER: ICD-10-CM

## 2021-08-04 DIAGNOSIS — Z00.00 WELL ADULT EXAM: ICD-10-CM

## 2021-08-04 DIAGNOSIS — R73.9 HYPERGLYCEMIA: ICD-10-CM

## 2021-08-04 DIAGNOSIS — R17 ELEVATED BILIRUBIN: Primary | ICD-10-CM

## 2021-08-04 DIAGNOSIS — E78.5 HYPERLIPIDEMIA, UNSPECIFIED HYPERLIPIDEMIA TYPE: ICD-10-CM

## 2021-08-04 DIAGNOSIS — L60.9 FINGERNAIL ABNORMALITIES: ICD-10-CM

## 2021-08-04 LAB
HBA1C MFR BLD: 5.2 % (ref 4.8–5.6)
Lab: NORMAL
WRITTEN AUTHORIZATION: NORMAL

## 2021-08-04 PROCEDURE — 99396 PREV VISIT EST AGE 40-64: CPT | Performed by: INTERNAL MEDICINE

## 2021-08-04 RX ORDER — DIPHENOXYLATE HYDROCHLORIDE AND ATROPINE SULFATE 2.5; .025 MG/1; MG/1
TABLET ORAL
COMMUNITY
End: 2023-03-02

## 2021-08-04 NOTE — PROGRESS NOTES
"Chief Complaint  Annual Exam (cpe )    Subjective          Yomaira Ball presents to [facility]  History of Present Illness  Here for CPE and doing well.  A few concerns.  She has lost 15 pounds intentionally and is not able to lose any more weight.  Has stopped eating sugars.  H/o HT on levoxyl 100 mcg qd.  H/o mild VDD and isn't great about taking D3 supplement.  Since she had the septoplasty in 3/2021, she has had less ha's.      Objective   Vital Signs:   /82   Pulse 74   Temp 96.8 °F (36 °C) (Temporal)   Resp 16   Ht 154.9 cm (61\")   Wt 64 kg (141 lb)   SpO2 98%   BMI 26.64 kg/m²     Physical Exam  Vitals and nursing note reviewed.   Constitutional:       Appearance: Normal appearance. She is well-developed.   HENT:      Head: Normocephalic and atraumatic.      Right Ear: External ear normal.      Left Ear: External ear normal.   Eyes:      Extraocular Movements: Extraocular movements intact.      Conjunctiva/sclera: Conjunctivae normal.   Neck:      Vascular: No carotid bruit.   Cardiovascular:      Rate and Rhythm: Normal rate and regular rhythm.      Heart sounds: Normal heart sounds.      Comments: No bruits  Pulmonary:      Effort: Pulmonary effort is normal. No respiratory distress.      Breath sounds: Normal breath sounds. No wheezing or rales.   Abdominal:      General: Bowel sounds are normal. There is no distension.      Palpations: Abdomen is soft. There is no mass.      Tenderness: There is no abdominal tenderness.   Musculoskeletal:      Cervical back: Neck supple.   Lymphadenopathy:      Cervical: No cervical adenopathy.   Skin:     General: Skin is warm.   Neurological:      General: No focal deficit present.      Mental Status: She is alert and oriented to person, place, and time.   Psychiatric:         Mood and Affect: Mood normal.         Behavior: Behavior normal.         Thought Content: Thought content normal.         Judgment: Judgment normal. "        Result Review :                 Assessment and Plan    Diagnoses and all orders for this visit:    1. Elevated bilirubin (Primary)  -     Bilirubin, Total & Direct    2. Hyperglycemia    3. Hypothyroidism, unspecified type    4. Hyperlipidemia, unspecified hyperlipidemia type    5. Well adult exam        Follow Up   Return in about 1 year (around 8/4/2022).  Patient was given instructions and counseling regarding her condition or for health maintenance advice. Please see specific information pulled into the AVS if appropriate.

## 2021-08-05 LAB
BILIRUB DIRECT SERPL-MCNC: 0.3 MG/DL (ref 0–0.3)
BILIRUB INDIRECT SERPL-MCNC: 1.6 MG/DL
BILIRUB SERPL-MCNC: 1.9 MG/DL (ref 0–1.2)

## 2021-08-05 RX ORDER — LEVOTHYROXINE SODIUM 100 UG/1
TABLET ORAL
Qty: 90 TABLET | Refills: 3 | Status: SHIPPED | OUTPATIENT
Start: 2021-08-05 | End: 2022-08-16

## 2021-08-05 NOTE — TELEPHONE ENCOUNTER
Rx Refill Note  Requested Prescriptions     Pending Prescriptions Disp Refills   • Euthyrox 100 MCG tablet [Pharmacy Med Name: Euthyrox 100 MCG Oral Tablet] 90 tablet 0     Sig: Take 1 tablet by mouth once daily      Last office visit with prescribing clinician: 8/4/2021      Next office visit with prescribing clinician: Visit date not found            Gadiel Lowry MA  08/05/21, 09:17 EDT

## 2021-08-06 DIAGNOSIS — R17 ELEVATED BILIRUBIN: Primary | ICD-10-CM

## 2021-09-08 ENCOUNTER — LAB (OUTPATIENT)
Dept: LAB | Facility: HOSPITAL | Age: 56
End: 2021-09-08

## 2021-09-08 PROCEDURE — 82247 BILIRUBIN TOTAL: CPT | Performed by: INTERNAL MEDICINE

## 2021-09-08 PROCEDURE — 82248 BILIRUBIN DIRECT: CPT | Performed by: INTERNAL MEDICINE

## 2022-06-13 ENCOUNTER — APPOINTMENT (OUTPATIENT)
Dept: WOMENS IMAGING | Facility: HOSPITAL | Age: 57
End: 2022-06-13

## 2022-06-13 PROCEDURE — 77063 BREAST TOMOSYNTHESIS BI: CPT | Performed by: RADIOLOGY

## 2022-06-13 PROCEDURE — 77067 SCR MAMMO BI INCL CAD: CPT | Performed by: RADIOLOGY

## 2022-07-19 ENCOUNTER — OFFICE VISIT (OUTPATIENT)
Dept: FAMILY MEDICINE CLINIC | Facility: CLINIC | Age: 57
End: 2022-07-19

## 2022-07-19 VITALS
OXYGEN SATURATION: 99 % | TEMPERATURE: 96.8 F | WEIGHT: 153.2 LBS | BODY MASS INDEX: 28.92 KG/M2 | SYSTOLIC BLOOD PRESSURE: 126 MMHG | DIASTOLIC BLOOD PRESSURE: 80 MMHG | RESPIRATION RATE: 18 BRPM | HEIGHT: 61 IN | HEART RATE: 65 BPM

## 2022-07-19 DIAGNOSIS — Z79.899 MEDICATION MANAGEMENT: ICD-10-CM

## 2022-07-19 DIAGNOSIS — E03.9 HYPOTHYROIDISM, UNSPECIFIED TYPE: Primary | ICD-10-CM

## 2022-07-19 PROCEDURE — 99213 OFFICE O/P EST LOW 20 MIN: CPT | Performed by: NURSE PRACTITIONER

## 2022-07-19 NOTE — PROGRESS NOTES
"Chief Complaint  Hypothyroidism, Spasms (Neck, esophagus area x 2 wk), and Abdominal Pain (X months)    Subjective        Yomaira Ball presents to [facility]  Yomaira presents for follow up for hypothyroid. She is currently taking euthyrox. Feels a flutter sensation in esophagus. Recent remodeling in her home, tearing up tile floors etc. States noticed it around that time. Stopped and did improve, but continues daily.     Also reports lower left sharp pain, intermittent over past 9 months. Recent GYN visit and everything was normal. Hx of colon resection. Stool changed to pellets, but more regular, in the past two months    Fell and hurt her knee in 2016, landed on her tailbone, suspected fracture. Now having more pain.  Midline and to the left    Hypothyroidism  Associated symptoms include abdominal pain.   Abdominal Pain        Objective   Vital Signs:  /80 (BP Location: Right arm, Patient Position: Sitting, Cuff Size: Adult)   Pulse 65   Temp 96.8 °F (36 °C) (Temporal)   Resp 18   Ht 154.9 cm (60.98\")   Wt 69.5 kg (153 lb 3.2 oz)   SpO2 99%   BMI 28.96 kg/m²   Estimated body mass index is 28.96 kg/m² as calculated from the following:    Height as of this encounter: 154.9 cm (60.98\").    Weight as of this encounter: 69.5 kg (153 lb 3.2 oz).          Physical Exam  Constitutional:       Appearance: Normal appearance.   HENT:      Left Ear: Ear canal normal.      Mouth/Throat:      Mouth: Mucous membranes are moist.      Pharynx: Oropharynx is clear.   Eyes:      Pupils: Pupils are equal, round, and reactive to light.   Neck:      Thyroid: No thyroid mass, thyromegaly or thyroid tenderness.      Vascular: No carotid bruit or JVD.   Cardiovascular:      Rate and Rhythm: Normal rate and regular rhythm.      Heart sounds: No murmur heard.    No friction rub. No gallop.   Pulmonary:      Effort: Pulmonary effort is normal. No respiratory distress.      Breath sounds: Normal " breath sounds. No wheezing, rhonchi or rales.   Lymphadenopathy:      Cervical: No cervical adenopathy.   Skin:     General: Skin is warm and dry.   Neurological:      Mental Status: She is alert and oriented to person, place, and time.   Psychiatric:         Mood and Affect: Mood normal.        Result Review :                Assessment and Plan   Diagnoses and all orders for this visit:    1. Hypothyroidism, unspecified type (Primary)  -     TSH Rfx On Abnormal To Free T4    2. Medication management  -     TSH Rfx On Abnormal To Free T4  -     Comprehensive metabolic panel  -     CBC & Differential             Follow Up   No follow-ups on file.  Patient was given instructions and counseling regarding her condition or for health maintenance advice. Please see specific information pulled into the AVS if appropriate.     Hypothyroid: will proceed with labs today, will proceed with cmp and cbc  Feels like having spasm in neck,   Heart rate is normal, bp is well controlled  If thyroid is normal and she continuess to have symptoms recommend follow up with PCP

## 2022-07-20 LAB
ALBUMIN SERPL-MCNC: 5.1 G/DL (ref 3.8–4.9)
ALBUMIN/GLOB SERPL: 2.6 {RATIO} (ref 1.2–2.2)
ALP SERPL-CCNC: 83 IU/L (ref 44–121)
ALT SERPL-CCNC: 49 IU/L (ref 0–32)
AST SERPL-CCNC: 28 IU/L (ref 0–40)
BASOPHILS # BLD AUTO: 0.1 X10E3/UL (ref 0–0.2)
BASOPHILS NFR BLD AUTO: 2 %
BILIRUB SERPL-MCNC: 1.4 MG/DL (ref 0–1.2)
BUN SERPL-MCNC: 12 MG/DL (ref 6–24)
BUN/CREAT SERPL: 20 (ref 9–23)
CALCIUM SERPL-MCNC: 10.1 MG/DL (ref 8.7–10.2)
CHLORIDE SERPL-SCNC: 105 MMOL/L (ref 96–106)
CO2 SERPL-SCNC: 25 MMOL/L (ref 20–29)
CREAT SERPL-MCNC: 0.59 MG/DL (ref 0.57–1)
EGFRCR SERPLBLD CKD-EPI 2021: 104 ML/MIN/1.73
EOSINOPHIL # BLD AUTO: 0.1 X10E3/UL (ref 0–0.4)
EOSINOPHIL NFR BLD AUTO: 2 %
ERYTHROCYTE [DISTWIDTH] IN BLOOD BY AUTOMATED COUNT: 12.9 % (ref 11.7–15.4)
GLOBULIN SER CALC-MCNC: 2 G/DL (ref 1.5–4.5)
GLUCOSE SERPL-MCNC: 66 MG/DL (ref 65–99)
HCT VFR BLD AUTO: 49.2 % (ref 34–46.6)
HGB BLD-MCNC: 16.6 G/DL (ref 11.1–15.9)
IMM GRANULOCYTES # BLD AUTO: 0 X10E3/UL (ref 0–0.1)
IMM GRANULOCYTES NFR BLD AUTO: 0 %
LYMPHOCYTES # BLD AUTO: 2.4 X10E3/UL (ref 0.7–3.1)
LYMPHOCYTES NFR BLD AUTO: 37 %
MCH RBC QN AUTO: 29.7 PG (ref 26.6–33)
MCHC RBC AUTO-ENTMCNC: 33.7 G/DL (ref 31.5–35.7)
MCV RBC AUTO: 88 FL (ref 79–97)
MONOCYTES # BLD AUTO: 0.7 X10E3/UL (ref 0.1–0.9)
MONOCYTES NFR BLD AUTO: 11 %
NEUTROPHILS # BLD AUTO: 3.1 X10E3/UL (ref 1.4–7)
NEUTROPHILS NFR BLD AUTO: 48 %
PLATELET # BLD AUTO: 291 X10E3/UL (ref 150–450)
POTASSIUM SERPL-SCNC: 4.9 MMOL/L (ref 3.5–5.2)
PROT SERPL-MCNC: 7.1 G/DL (ref 6–8.5)
RBC # BLD AUTO: 5.58 X10E6/UL (ref 3.77–5.28)
SODIUM SERPL-SCNC: 146 MMOL/L (ref 134–144)
TSH SERPL DL<=0.005 MIU/L-ACNC: 1.87 UIU/ML (ref 0.45–4.5)
WBC # BLD AUTO: 6.4 X10E3/UL (ref 3.4–10.8)

## 2022-08-09 ENCOUNTER — OFFICE VISIT (OUTPATIENT)
Dept: FAMILY MEDICINE CLINIC | Facility: CLINIC | Age: 57
End: 2022-08-09

## 2022-08-09 ENCOUNTER — TELEPHONE (OUTPATIENT)
Dept: FAMILY MEDICINE CLINIC | Facility: CLINIC | Age: 57
End: 2022-08-09

## 2022-08-09 VITALS
TEMPERATURE: 97.8 F | OXYGEN SATURATION: 100 % | SYSTOLIC BLOOD PRESSURE: 131 MMHG | DIASTOLIC BLOOD PRESSURE: 81 MMHG | HEART RATE: 92 BPM

## 2022-08-09 DIAGNOSIS — U07.1 COVID-19: Primary | ICD-10-CM

## 2022-08-09 DIAGNOSIS — J02.9 SORE THROAT: ICD-10-CM

## 2022-08-09 LAB
EXPIRATION DATE: NORMAL
INTERNAL CONTROL: NORMAL
Lab: NORMAL
S PYO AG THROAT QL: NEGATIVE

## 2022-08-09 PROCEDURE — 99213 OFFICE O/P EST LOW 20 MIN: CPT | Performed by: FAMILY MEDICINE

## 2022-08-09 PROCEDURE — 87880 STREP A ASSAY W/OPTIC: CPT | Performed by: FAMILY MEDICINE

## 2022-08-09 NOTE — TELEPHONE ENCOUNTER
Caller: Yomaira Ball    Relationship to patient: Self    Best call back number: [phone]    Date of positive COVID19 test: 8/9    COVID19 symptoms: SORE THROAT, RUNNY NOSE, CONGESTION, FATIGUE, ACHY    Additional information or concerns: PLEASE ADVISE WHAT PATIENT CAN TAKE TO RELIEVE SORE THROAT. PATIENT IS UNABLE TO SLEEP DUE TO PAIN AND BEING UNCOMFORTABLE    What is the patients preferred pharmacy: [facility]/pharmacy #[id number] [place], [place] [facility] [address] [facility] [facility] [phone] [facility] [phone]

## 2022-08-09 NOTE — TELEPHONE ENCOUNTER
Called patient in regards to message, patient needs to be seen in office or video visit. Hub to read and schedule, if no where to schedule please transfer to office or advise urgent care video visit

## 2022-08-09 NOTE — PROGRESS NOTES
"Chief Complaint  URI (Covid+ 8/9/22/Sym started Saturday 8/6/22)    Subjective        Yomaira Ball presents to [facility]  History of Present Illness    Patient was in [place] last year week with grandchildren.  Saturday night, about 4 days ago, started having a sore throat.  Then had a low-grade fever.  Then had a positive test in the next day or 2.  Then sinus congestion with nasal drainage.  And now a severe sore throat that keeps her up at nighttime.  No high fever.  She is having some coughing.  She is vaccinated against COVID-19 previously.  She has had no severe shortness of breath.  She has no significant medical problems other than hypothyroidism.  No immunodeficiency.  No asthma.  She is 56 years old.  I am seeing her for the first time today.  Her primary care provider is not available    Objective   Vital Signs:  /81   Pulse 92   Temp 97.8 °F (36.6 °C)   SpO2 100%   Estimated body mass index is 28.96 kg/m² as calculated from the following:    Height as of 7/19/22: 154.9 cm (60.98\").    Weight as of 7/19/22: 69.5 kg (153 lb 3.2 oz).          Physical Exam  Constitutional:       General: She is not in acute distress.     Appearance: She is not ill-appearing.   HENT:      Mouth/Throat:      Mouth: Mucous membranes are moist.      Pharynx: Posterior oropharyngeal erythema present. No oropharyngeal exudate.   Cardiovascular:      Rate and Rhythm: Normal rate and regular rhythm.   Pulmonary:      Effort: Pulmonary effort is normal. No respiratory distress.      Breath sounds: No stridor. No wheezing, rhonchi or rales.   Musculoskeletal:      Cervical back: Neck supple.   Lymphadenopathy:      Cervical: Cervical adenopathy present.   Psychiatric:         Mood and Affect: Mood normal.        Result Review :                Assessment and Plan   Diagnoses and all orders for this visit:    1. COVID-19 (Primary)    2. Sore throat  -     POCT rapid strep A      COVID-19 URI " syndrome with viral pharyngitis symptoms.  Positive home COVID test.  Negative rapid strep today.  At this time I recommend symptomatic treatment with Tylenol and over-the-counter Advil as directed.  Salt water gargles.  She can also gargle with liquid Benadryl.  I did offer her hydrocodone for pain relief at nighttime, but she states she does not like taking hydrocodone because it makes her itch.  With severe symptoms she will seek medical attention.  She will follow-up with her primary care provider as needed and for as scheduled.         Follow Up   No follow-ups on file.  Patient was given instructions and counseling regarding her condition or for health maintenance advice. Please see specific information pulled into the AVS if appropriate.

## 2022-08-15 NOTE — TELEPHONE ENCOUNTER
Rx Refill Note  Requested Prescriptions     Pending Prescriptions Disp Refills   • Euthyrox 100 MCG tablet [Pharmacy Med Name: Euthyrox 100 MCG Oral Tablet] 90 tablet 0     Sig: Take 1 tablet by mouth once daily      Last office visit with prescribing clinician: 8/4/2021      Next office visit with prescribing clinician: 7/18/2023            Paulette Blanco MA  08/15/22, 09:53 EDT

## 2022-08-16 RX ORDER — LEVOTHYROXINE SODIUM 100 UG/1
TABLET ORAL
Qty: 90 TABLET | Refills: 0 | Status: SHIPPED | OUTPATIENT
Start: 2022-08-16 | End: 2022-12-05

## 2022-12-02 NOTE — TELEPHONE ENCOUNTER
Rx Refill Note  Requested Prescriptions     Pending Prescriptions Disp Refills   • levothyroxine (SYNTHROID, LEVOTHROID) 100 MCG tablet [Pharmacy Med Name: Levothyroxine Sodium 100 MCG Oral Tablet] 90 tablet 0     Sig: Take 1 tablet by mouth once daily      Last office visit with prescribing clinician: 8/4/2021   Last telemedicine visit with prescribing clinician: Visit date not found   Next office visit with prescribing clinician: 7/18/2023       {TIP  Please add Last Relevant Lab Date if appropriate: 7/19/22                 Would you like a call back once the refill request has been completed: [] Yes [] No    If the office needs to give you a call back, can they leave a voicemail: [] Yes [] No    Gadiel Lowry MA  12/02/22, 16:18 EST

## 2022-12-05 RX ORDER — LEVOTHYROXINE SODIUM 0.1 MG/1
TABLET ORAL
Qty: 90 TABLET | Refills: 0 | Status: SHIPPED | OUTPATIENT
Start: 2022-12-05 | End: 2023-03-07

## 2023-02-14 ENCOUNTER — HOSPITAL ENCOUNTER (OUTPATIENT)
Dept: GENERAL RADIOLOGY | Facility: HOSPITAL | Age: 58
Discharge: HOME | End: 2023-02-14
Admitting: NURSE PRACTITIONER
Payer: COMMERCIAL

## 2023-02-14 ENCOUNTER — OFFICE VISIT (OUTPATIENT)
Dept: FAMILY MEDICINE CLINIC | Facility: CLINIC | Age: 58
End: 2023-02-14
Payer: COMMERCIAL

## 2023-02-14 VITALS
WEIGHT: 149.3 LBS | SYSTOLIC BLOOD PRESSURE: 132 MMHG | DIASTOLIC BLOOD PRESSURE: 88 MMHG | HEIGHT: 61 IN | RESPIRATION RATE: 12 BRPM | TEMPERATURE: 97.6 F | HEART RATE: 79 BPM | BODY MASS INDEX: 28.19 KG/M2 | OXYGEN SATURATION: 98 %

## 2023-02-14 DIAGNOSIS — Z90.49 HISTORY OF PARTIAL COLECTOMY: ICD-10-CM

## 2023-02-14 DIAGNOSIS — M53.3 COCCYDYNIA: ICD-10-CM

## 2023-02-14 DIAGNOSIS — R10.32 CHRONIC LEFT LOWER QUADRANT PAIN: ICD-10-CM

## 2023-02-14 DIAGNOSIS — R10.30 LOWER ABDOMINAL PAIN: Primary | ICD-10-CM

## 2023-02-14 DIAGNOSIS — K63.5 POLYP OF COLON, UNSPECIFIED PART OF COLON, UNSPECIFIED TYPE: Primary | ICD-10-CM

## 2023-02-14 DIAGNOSIS — K63.5 POLYP OF SIGMOID COLON, UNSPECIFIED TYPE: ICD-10-CM

## 2023-02-14 DIAGNOSIS — G89.29 CHRONIC LEFT LOWER QUADRANT PAIN: ICD-10-CM

## 2023-02-14 PROCEDURE — 99214 OFFICE O/P EST MOD 30 MIN: CPT | Performed by: NURSE PRACTITIONER

## 2023-02-14 PROCEDURE — 72220 X-RAY EXAM SACRUM TAILBONE: CPT

## 2023-02-14 NOTE — PATIENT INSTRUCTIONS
Discharge instructions    Referral for colonoscopy and follow-up on high risk polyps with colorectal surgery    The intermittent abdominal pain cannot rule out possibility of ovarian pain,could be a cyst although it was not identified on your last ultrasound  Make sure you follow-up with GYN in a few months for good measure and have this discussion    For now we will get a CT of the abdomen and pelvis hydrate well to protect your kidneys with contrast and call me for results    Lets go ahead and treat constipation to ensure this is not sigmoid pressure you are experiencing  MiraLAX every other day or daily for stool softener if loose stool hold or stop or decrease frequency    Any severe pain fever chills coffee-ground emesis black tarry stools emergency room   okay so that you are  Just a couple of Pepcid He have  pizza and beer  Ambulatory has noted or needed

## 2023-02-14 NOTE — PROGRESS NOTES
Chief Complaint  Abdominal Pain    Subjective        Yomaira Ball presents to [facility]  History of Present Illness  Pleasant patient complains of intermittent left lower pelvic pain has it frequently but not associated with any particular activity not with bowel or bladder  Saw her GYN about 8 months ago had a negative pelvic ultrasound for same complaint is a little bit more frequent presently not associated with any radiating pains nausea vomiting fever chills  No dysuria frequency urgency no bowel or bladder changes no unexplained weight loss or no unexplained night sweats    She has had a previous colon resection due to some precancerous polyps last EGD  Colonoscopy was 2 years ago Dr. summers    After having a piece of pizza and a couple beers she rarely drinks Sunday she had some epigastric discomfort for about 5 minutes or so somewhat to the right buttock without radiation otherwise chest pain shortness of breath and her symptoms resolved  She has had history call bladder removed  Gallstones  Has no recurrent gastritis symptoms    2016 he had fell and had a coccyx fracture at that time and healed but she has had some mild discomfort on and off over the years but more last several months noted it is more frequent some tenderness in the coccyx just wants to make sure is not related to her belly  This was not fully worked up today but initiated with    Stool is frequently hard sometimes diarrhea though    Abdominal Pain  This is a chronic problem. The current episode started more than 1 month ago. The onset quality is undetermined. The problem occurs daily. The most recent episode lasted 1 Hours. The problem has been gradually worsening. The pain is located in the LLQ and RUQ. The pain is at a severity of 1/10. The quality of the pain is cramping, dull and sharp. The abdominal pain radiates to the RLQ. Associated symptoms include constipation and flatus. Pertinent negatives  "include no anorexia, arthralgias, belching, diarrhea, dysuria, fever, frequency, headaches, hematochezia, hematuria, melena, myalgias, nausea, vomiting or weight loss. Prior diagnostic workup includes ultrasound.       Objective   Vital Signs:  /88   Pulse 79   Temp 97.6 °F (36.4 °C) (Infrared)   Resp 12   Ht 154.9 cm (60.98\")   Wt 67.7 kg (149 lb 4.8 oz)   SpO2 98%   BMI 28.23 kg/m²   Estimated body mass index is 28.23 kg/m² as calculated from the following:    Height as of this encounter: 154.9 cm (60.98\").    Weight as of this encounter: 67.7 kg (149 lb 4.8 oz).             Physical Exam  Vitals reviewed.   Constitutional:       General: She is not in acute distress.     Appearance: Normal appearance. She is well-developed. She is not ill-appearing, toxic-appearing or diaphoretic.   HENT:      Head: Normocephalic.      Nose: Nose normal.   Eyes:      General: No scleral icterus.     Conjunctiva/sclera: Conjunctivae normal.      Pupils: Pupils are equal, round, and reactive to light.   Neck:      Thyroid: No thyromegaly.      Vascular: No JVD.   Cardiovascular:      Rate and Rhythm: Normal rate and regular rhythm.      Heart sounds: Normal heart sounds. No murmur heard.    No friction rub. No gallop.   Pulmonary:      Effort: Pulmonary effort is normal. No respiratory distress.      Breath sounds: Normal breath sounds. No stridor. No wheezing or rales.   Abdominal:      General: Bowel sounds are normal. There is no distension.      Palpations: Abdomen is soft.      Tenderness: There is no abdominal tenderness.      Comments: No hepatosplenomegaly, no ascites,  No distention no ascites no hepatosplenomegaly or masses  Left lower quadrant nontender mild tenderness over left pelvic region without bulge and standing with Valsalva  With gentle muscle wall palpation no palpable abdominal wall abnormalities or hernias noted.   Musculoskeletal:         General: No tenderness.      Cervical back: Neck supple. "   Lymphadenopathy:      Cervical: No cervical adenopathy.   Skin:     General: Skin is warm and dry.      Capillary Refill: Capillary refill takes less than 2 seconds.      Findings: No erythema or rash.   Neurological:      General: No focal deficit present.      Mental Status: She is alert and oriented to person, place, and time. Mental status is at baseline.      Deep Tendon Reflexes: Reflexes are normal and symmetric.   Psychiatric:         Behavior: Behavior normal.         Thought Content: Thought content normal.         Judgment: Judgment normal.        Result Review :                   Assessment and Plan   Diagnoses and all orders for this visit:    1. Lower abdominal pain (Primary)  -     CBC & Differential  -     Comprehensive Metabolic Panel  -     Sedimentation Rate  -     Urinalysis With Microscopic If Indicated (No Culture) - Urine, Clean Catch  -     CT Abdomen Pelvis With Contrast    2. Polyp of sigmoid colon, unspecified type  -     Ambulatory Referral For Screening Colonoscopy    3. Coccydynia  -     XR sacrum and coccyx           I spent 30  minutes caring for Yomaira on this date of service. This time includes time spent by me in the following activities:preparing for the visit, reviewing tests, obtaining and/or reviewing a separately obtained history, performing a medically appropriate examination and/or evaluation , counseling and educating the patient/family/caregiver, ordering medications, tests, or procedures, documenting information in the medical record and care coordination  Follow Up   Return in about 1 month (around 3/14/2023).  Patient was given instructions and counseling regarding her condition or for health maintenance advice. Please see specific information pulled into the AVS if appropriate.       Answers for HPI/ROS submitted by the patient on 2/13/2023  What is the primary reason for your visit?: Abdominal Pain    We will check a CT abdomen make sure the looks good here she will  follow-up with her GYN as well  As well as refer to colorectal surgery for history of precancerous polyps per patient history self-reported today    At follow-up will evaluate coccyx not fully evaluated today  Symptoms started after coccyx fracture may be a little bit of arthritis  We will need to make sure nothing else should be done here.  At follow-up        Discharge instructions    Referral for colonoscopy and follow-up on high risk polyps with colorectal surgery    The intermittent abdominal pain cannot rule out possibility of ovarian pain,could be a cyst although it was not identified on your last ultrasound  Make sure you follow-up with GYN in a few months for good measure and have this discussion    For now we will get a CT of the abdomen and pelvis hydrate well to protect your kidneys with contrast and call me for results    Lets go ahead and treat constipation to ensure this is not sigmoid pressure you are experiencing  MiraLAX every other day or daily for stool softener if loose stool hold or stop or decrease frequency    Any severe pain fever chills coffee-ground emesis black tarry stools emergency room   okay so that you are  Just a couple of Pepcid He have  pizza and beer  Ambulatory has noted or needed

## 2023-02-15 LAB
ALBUMIN SERPL-MCNC: 4.6 G/DL (ref 3.5–5.2)
ALBUMIN/GLOB SERPL: 2.1 G/DL
ALP SERPL-CCNC: 79 U/L (ref 39–117)
ALT SERPL-CCNC: 37 U/L (ref 1–33)
APPEARANCE UR: CLEAR
AST SERPL-CCNC: 22 U/L (ref 1–32)
BASOPHILS # BLD AUTO: 0.08 10*3/MM3 (ref 0–0.2)
BASOPHILS NFR BLD AUTO: 1.4 % (ref 0–1.5)
BILIRUB SERPL-MCNC: 1.6 MG/DL (ref 0–1.2)
BILIRUB UR QL STRIP: NEGATIVE
BUN SERPL-MCNC: 8 MG/DL (ref 6–20)
BUN/CREAT SERPL: 12.5 (ref 7–25)
CALCIUM SERPL-MCNC: 9.8 MG/DL (ref 8.6–10.5)
CHLORIDE SERPL-SCNC: 104 MMOL/L (ref 98–107)
CO2 SERPL-SCNC: 29.1 MMOL/L (ref 22–29)
COLOR UR: YELLOW
CREAT SERPL-MCNC: 0.64 MG/DL (ref 0.57–1)
EGFRCR SERPLBLD CKD-EPI 2021: 101.9 ML/MIN/1.73
EOSINOPHIL # BLD AUTO: 0.14 10*3/MM3 (ref 0–0.4)
EOSINOPHIL NFR BLD AUTO: 2.5 % (ref 0.3–6.2)
ERYTHROCYTE [DISTWIDTH] IN BLOOD BY AUTOMATED COUNT: 13.1 % (ref 12.3–15.4)
ERYTHROCYTE [SEDIMENTATION RATE] IN BLOOD BY WESTERGREN METHOD: 1 MM/HR (ref 0–30)
GLOBULIN SER CALC-MCNC: 2.2 GM/DL
GLUCOSE SERPL-MCNC: 100 MG/DL (ref 65–99)
GLUCOSE UR QL STRIP: NEGATIVE
HCT VFR BLD AUTO: 47.7 % (ref 34–46.6)
HGB BLD-MCNC: 15.8 G/DL (ref 12–15.9)
HGB UR QL STRIP: NEGATIVE
IMM GRANULOCYTES # BLD AUTO: 0.03 10*3/MM3 (ref 0–0.05)
IMM GRANULOCYTES NFR BLD AUTO: 0.5 % (ref 0–0.5)
KETONES UR QL STRIP: NEGATIVE
LEUKOCYTE ESTERASE UR QL STRIP: NEGATIVE
LYMPHOCYTES # BLD AUTO: 2.02 10*3/MM3 (ref 0.7–3.1)
LYMPHOCYTES NFR BLD AUTO: 35.4 % (ref 19.6–45.3)
MCH RBC QN AUTO: 29.6 PG (ref 26.6–33)
MCHC RBC AUTO-ENTMCNC: 33.1 G/DL (ref 31.5–35.7)
MCV RBC AUTO: 89.5 FL (ref 79–97)
MONOCYTES # BLD AUTO: 0.48 10*3/MM3 (ref 0.1–0.9)
MONOCYTES NFR BLD AUTO: 8.4 % (ref 5–12)
NEUTROPHILS # BLD AUTO: 2.95 10*3/MM3 (ref 1.7–7)
NEUTROPHILS NFR BLD AUTO: 51.8 % (ref 42.7–76)
NITRITE UR QL STRIP: NEGATIVE
NRBC BLD AUTO-RTO: 0 /100 WBC (ref 0–0.2)
PH UR STRIP: 6.5 [PH] (ref 5–8)
PLATELET # BLD AUTO: 267 10*3/MM3 (ref 140–450)
POTASSIUM SERPL-SCNC: 4.3 MMOL/L (ref 3.5–5.2)
PROT SERPL-MCNC: 6.8 G/DL (ref 6–8.5)
PROT UR QL STRIP: NEGATIVE
RBC # BLD AUTO: 5.33 10*6/MM3 (ref 3.77–5.28)
SODIUM SERPL-SCNC: 143 MMOL/L (ref 136–145)
SP GR UR STRIP: NORMAL (ref 1–1.03)
UROBILINOGEN UR STRIP-MCNC: NORMAL MG/DL
WBC # BLD AUTO: 5.7 10*3/MM3 (ref 3.4–10.8)

## 2023-03-02 ENCOUNTER — HOSPITAL ENCOUNTER (OUTPATIENT)
Dept: CT IMAGING | Facility: HOSPITAL | Age: 58
Discharge: HOME | End: 2023-03-02
Admitting: NURSE PRACTITIONER
Payer: COMMERCIAL

## 2023-03-02 PROBLEM — Z92.29 HISTORY OF VACCINATION: Status: ACTIVE | Noted: 2023-03-02

## 2023-03-02 PROBLEM — K59.00 CONSTIPATION: Status: ACTIVE | Noted: 2023-03-02

## 2023-03-02 PROBLEM — J30.2 SEASONAL ALLERGIES: Status: ACTIVE | Noted: 2023-03-02

## 2023-03-02 PROCEDURE — 74177 CT ABD & PELVIS W/CONTRAST: CPT

## 2023-03-02 PROCEDURE — 25510000001 IOPAMIDOL 61 % SOLUTION: Performed by: NURSE PRACTITIONER

## 2023-03-02 RX ADMIN — IOPAMIDOL 100 ML: 612 INJECTION, SOLUTION INTRAVENOUS at 12:11

## 2023-03-06 ENCOUNTER — OFFICE VISIT (OUTPATIENT)
Dept: SURGERY | Facility: CLINIC | Age: 58
End: 2023-03-06
Payer: COMMERCIAL

## 2023-03-06 VITALS
HEART RATE: 72 BPM | WEIGHT: 152.6 LBS | BODY MASS INDEX: 28.81 KG/M2 | SYSTOLIC BLOOD PRESSURE: 122 MMHG | OXYGEN SATURATION: 98 % | DIASTOLIC BLOOD PRESSURE: 74 MMHG | HEIGHT: 61 IN

## 2023-03-06 DIAGNOSIS — Z86.010 HISTORY OF COLON POLYPS: ICD-10-CM

## 2023-03-06 DIAGNOSIS — R10.32 LEFT LOWER QUADRANT PAIN: Primary | ICD-10-CM

## 2023-03-06 PROCEDURE — 99203 OFFICE O/P NEW LOW 30 MIN: CPT | Performed by: COLON & RECTAL SURGERY

## 2023-03-06 RX ORDER — SODIUM CHLORIDE, SODIUM LACTATE, POTASSIUM CHLORIDE, CALCIUM CHLORIDE 600; 310; 30; 20 MG/100ML; MG/100ML; MG/100ML; MG/100ML
30 INJECTION, SOLUTION INTRAVENOUS CONTINUOUS
OUTPATIENT
Start: 2023-05-15

## 2023-03-06 NOTE — PROGRESS NOTES
Yomaira Ball is a 57 y.o. female who is seen as a consult at the request of James Epley, APRN for Colon Polyps.      HPI:      The patient began having pain on her lower left quadrant 9 to 10 months ago. She decided to have her ovaries checked with her OBGYN and had an ultrasound done. Her ultrasound was clear. She later had a bladder infection after 5 months had passed by. Her primary care provider decided to continue monitoring her due to her history. She states the pain continued to fluctuate and then it began happening more often. She had a colon resection on her right side for precancerous polyps in the cecum section. Her bowel movements fluctuate; she will be normal for a month or two, have hard stools for the next 6 months and loose stools for a week or two weeks. Her pain does not feel like constipation pain, and it does not resolve after having loose stool for a week or two. After seeing her nurse practitioner, James Epley, she was very sore for 2 days because he was pushing her abdomen, and was even kept awake because of the pain. She has been taking MiraLAX every other day and has not had as much discomfort. She has not taken a fiber supplement. The patient does not feel like she completely empties herself out after having bowel movements. When having to strain, she will stay in pain for a couple of days and it happens at least once a month.     The patient states she was recently informed her grandmother passed away from colon cancer, but she is not completely unsure.     Past Medical History:   Diagnosis Date   • Allergic rhinitis     FOLLOWED BY DR. RENO UGARTE   • Chronic LLQ pain    • Coccydynia 02/2023   • Colon polyps     FOLLOWED BY DR. 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 MARIA E WATT   • Hormone replacement therapy (HRT)     MINIVELLE, ESTRACE   • Hyperglycemia 12/02/2016   • Hyperlipidemia 08/01/2017   • Hypothyroidism    • Laceration of left lower leg 06/18/2022    SEEN AT    • Morning headache    • Multinodular goiter    • ARMENDARIZ (nonalcoholic steatohepatitis)    • Nummular eczema 08/01/2017   • Polycythemia 09/24/2019   • Rectocele     S/P REPAIR   • Seasonal allergies 03/02/2023   • Seborrheic keratosis    • Snoring    • Tick bite 09/17/2018   • Urinary incontinence 05/2015   • Uterine prolapse     S/P REPAIR   • Vitamin D deficiency        Past Surgical History:   Procedure Laterality Date   • CHOLECYSTECTOMY N/A 1998    LAPAROSCOPIC   • COLON RESECTION RIGHT OR TRANSVERSE LAPAROSCOPIC N/A 11/21/2013   • COLONOSCOPY N/A 11/21/2015    WNL, ANASTAMOSIS HEALTHY, RESCOPE IN 5 YRS, DR. JANIYA CHRISTINE AT [facility]   • COLONOSCOPY N/A 11/18/2017    ENTIRE COLON WNL, INTERNAL HEMORRHOIDS, RESCOPE IN 5 YRS, DR. ALESSIO SOUZA AT [facility]   • COLONOSCOPY N/A 09/11/2020    ENTIRE COLON WNL, RESCOPE IN 5 YRS, DR. JANIYA CHRISTINE AT [facility]   • ENDOSCOPY AND COLONOSCOPY N/A 09/23/2013    LARGE SESSILE SERRATED ADENOMA POLYP IN CECUM, DR. TANIA CARUSO AT [facility]   • FINGER SURGERY Right 09/22/2021    EXCISION OF RIGHT LONG FINGER MUCOUS CYST AND DEBRIDEMENT OF OSTEOPHYTE, DR. GERALDINE LAWRENCE AT [facility]   • HEMICOLOECTOMY W/ ANASTOMOSIS Right 11/21/2013    LAPAROSCOPIC, DR. JANIYA CHRISTINE AT [facility]   • HYSTERECTOMY N/A 05/12/2015    [facility] WITH BSO, AND ENTEROLYSIS, DR. MIGUE BENNETT AT [facility]   • HYSTEROSCOPY ENDOMETRIAL ABLATION N/A 12/09/2011    DR. MARIA E AGARWAL AT [facility]   • KNEE ARTHROSCOPY W/ ACL  RECONSTRUCTION Right 12/19/2016    DR. REY MENA AT [facility]   • KNEE ARTHROSCOPY W/ HARDWARE REMOVAL Right 07/10/2017    WITH CHONDROPLASTY, DR. REY MENA AT [facility]   • POSTERIOR REPAIR N/A 05/12/2015    DR. MIGUE BENNETT AT [facility]   • SEPTOPLASTY, RESECTION INFERIOR TURBINATES Bilateral 02/16/2021    Procedure: SEPTOPLASTY, RESECTION INFERIOR TURBINATES;  Surgeon: Michael Casiano MD;  [place] [facility];  Service: ENT;  Laterality: Bilateral;   • TONSILLECTOMY Bilateral     Childhood   • UTEROSACRAL SUSPENSION LAPAROSCOPIC N/A 05/12/2015    WITH TVT AND CYSTOSCOPY, DR. IMGUE BENNETT AT [facility]       Social History:   reports that she has never smoked. She has never been exposed to tobacco smoke. She has never used smokeless tobacco. She reports current alcohol use. She reports that she does not use drugs.      Marriage status:     Family History   Problem Relation Age of Onset   • Cancer Mother    • Diabetes Mother    • Hypertension Mother    • Heart disease Father    • Hypertension Father    • Cancer Father    • Diabetes Sister    • Hypertension Sister    • Diabetes Brother    • Hypertension Brother    • Cancer Brother    • Stroke Brother    • Diabetes Brother    • Hypertension Son    • Cancer Maternal Aunt    • Breast cancer Maternal Aunt         Diagnosed in her 50s         Current Outpatient Medications:   •  levothyroxine (SYNTHROID, LEVOTHROID) 100 MCG tablet, Take 1 tablet by mouth once daily, Disp: 90 tablet, Rfl: 0    Allergy  Benzalkonium chloride and Cefdinir    Review of Systems   Constitutional: Negative for decreased appetite and weight gain.   HENT: Positive for tinnitus. Negative for congestion, hearing loss and hoarse voice.    Eyes: Negative for blurred vision, discharge and visual disturbance.   Cardiovascular: Negative for chest pain, cyanosis and leg swelling.   Respiratory: Negative for cough, shortness of breath, sleep disturbances due to breathing and snoring.     Endocrine: Negative for cold intolerance and heat intolerance.   Hematologic/Lymphatic: Does not bruise/bleed easily.   Skin: Negative for itching, poor wound healing and skin cancer.   Musculoskeletal: Negative for arthritis, back pain, joint pain and joint swelling.   Gastrointestinal: Negative for abdominal pain, change in bowel habit, bowel incontinence and constipation.   Genitourinary: Negative for bladder incontinence, dysuria and hematuria.   Neurological: Negative for brief paralysis, excessive daytime sleepiness, dizziness, focal weakness, headaches, light-headedness and weakness.   Psychiatric/Behavioral: Negative for altered mental status and hallucinations. The patient does not have insomnia.    Allergic/Immunologic: Negative for HIV exposure and persistent infections.   All other systems reviewed and are negative.      Vitals:    03/06/23 1520   BP: 122/74   Pulse: 72   SpO2: 98%     Body mass index is 28.83 kg/m².    Physical Exam  Constitutional:       General: She is not in acute distress.     Appearance: She is well-developed.   HENT:      Head: Normocephalic and atraumatic.   Abdominal:      General: There is no distension.      Palpations: Abdomen is soft.   Musculoskeletal:         General: Normal range of motion.   Neurological:      Mental Status: She is alert.   Psychiatric:         Thought Content: Thought content normal.         Review of Medical Record: The patient has a history of colon polyps. She had a CAT scan done by primary care on 03/02/2023, no inflammation was noted. The patient had a colonoscopy on 09/11/2020. for history of polyps and it was negative. The patient had a right colectomy for adenoma in the past.    Assessment:  1. Left lower quadrant pain    2. History of colon polyps        Plan:    -The patient will begin taking fiber supplements in addition to the MiraLAX she has been taking to keep her bowels moving. Due to the history of colon polyps and potential family  history, she will have a colonoscopy repeated.      Transcribed from ambient dictation for Riley Sanchez MD by Ange Negrete.  03/06/23   17:51 EST    Patient or patient representative verbalized consent to the visit recording.  I have personally performed the services described in this document as transcribed by the above individual, and it is both accurate and complete.

## 2023-03-06 NOTE — TELEPHONE ENCOUNTER
Rx Refill Note  Requested Prescriptions     Pending Prescriptions Disp Refills   • levothyroxine (SYNTHROID, LEVOTHROID) 100 MCG tablet [Pharmacy Med Name: Levothyroxine Sodium 100 MCG Oral Tablet] 90 tablet 0     Sig: Take 1 tablet by mouth once daily      Last office visit with prescribing clinician: 8/4/2021   Last telemedicine visit with prescribing clinician: 3/14/2023   Next office visit with prescribing clinician: 7/18/2023       {TIP  Please add Last Relevant Lab Date if appropriate: 7/19/22                 Would you like a call back once the refill request has been completed: [] Yes [] No    If the office needs to give you a call back, can they leave a voicemail: [] Yes [] No    Gadiel Lowry MA  03/06/23, 10:25 EST

## 2023-03-07 RX ORDER — LEVOTHYROXINE SODIUM 0.1 MG/1
TABLET ORAL
Qty: 90 TABLET | Refills: 0 | Status: SHIPPED | OUTPATIENT
Start: 2023-03-07

## 2023-03-14 ENCOUNTER — OFFICE VISIT (OUTPATIENT)
Dept: FAMILY MEDICINE CLINIC | Facility: CLINIC | Age: 58
End: 2023-03-14
Payer: COMMERCIAL

## 2023-03-14 VITALS
TEMPERATURE: 97.4 F | SYSTOLIC BLOOD PRESSURE: 126 MMHG | WEIGHT: 152 LBS | RESPIRATION RATE: 14 BRPM | OXYGEN SATURATION: 98 % | BODY MASS INDEX: 28.7 KG/M2 | HEIGHT: 61 IN | DIASTOLIC BLOOD PRESSURE: 80 MMHG | HEART RATE: 78 BPM

## 2023-03-14 DIAGNOSIS — R10.32 LEFT LOWER QUADRANT ABDOMINAL PAIN: ICD-10-CM

## 2023-03-14 DIAGNOSIS — K76.0 FATTY LIVER: ICD-10-CM

## 2023-03-14 DIAGNOSIS — K59.00 CONSTIPATION, UNSPECIFIED CONSTIPATION TYPE: Primary | ICD-10-CM

## 2023-03-14 PROCEDURE — 99213 OFFICE O/P EST LOW 20 MIN: CPT | Performed by: NURSE PRACTITIONER

## 2023-03-14 RX ORDER — CHOLECALCIFEROL (VITAMIN D3) 125 MCG
CAPSULE ORAL
COMMUNITY
Start: 2022-12-01

## 2023-03-14 RX ORDER — MULTIVIT-MIN/IRON/FOLIC ACID/K 18-600-40
CAPSULE ORAL
COMMUNITY
Start: 2023-02-01

## 2023-03-14 NOTE — PROGRESS NOTES
"Chief Complaint  Abdominal Pain (1 month f/u)    Subjective        Yomaira Ball presents to [facility] PRIMARY [facility]ARE  History of Present Illness  Pleasant patient here today follow-up lower abdominal pressure and pain, with some constipation negative CT otherwise except some mild fatty liver, she is already seeing GI they suggested adding fiber she is taking MiraLAX every other day and helping  No increased belly pain fever chills or other complaints presently no weight loss or night sweats  She is willing to work on her diet with fatty liver already tries to watch it does not eat a lot of carbs.  Abdominal Pain        Objective   Vital Signs:  /80   Pulse 78   Temp 97.4 °F (36.3 °C) (Infrared)   Resp 14   Ht 154.9 cm (61\")   Wt 68.9 kg (152 lb)   SpO2 98%   BMI 28.72 kg/m²   Estimated body mass index is 28.72 kg/m² as calculated from the following:    Height as of this encounter: 154.9 cm (61\").    Weight as of this encounter: 68.9 kg (152 lb).             Physical Exam  Constitutional:       General: She is not in acute distress.     Appearance: She is not ill-appearing, toxic-appearing or diaphoretic.   Eyes:      Conjunctiva/sclera: Conjunctivae normal.      Pupils: Pupils are equal, round, and reactive to light.   Pulmonary:      Effort: Pulmonary effort is normal. No respiratory distress.      Breath sounds: No stridor.   Abdominal:      General: Abdomen is flat. Bowel sounds are normal. There is no distension.      Palpations: There is no mass.      Tenderness: There is no abdominal tenderness. There is no right CVA tenderness, left CVA tenderness, guarding or rebound.      Hernia: No hernia is present.   Skin:     General: Skin is warm and dry.   Neurological:      General: No focal deficit present.      Mental Status: She is alert and oriented to person, place, and time.   Psychiatric:         Mood and Affect: Mood normal.         Behavior: Behavior normal.        Result " Review :                   Assessment and Plan   Diagnoses and all orders for this visit:    1. Constipation, unspecified constipation type (Primary)    2. Left lower quadrant abdominal pain             Follow Up   No follow-ups on file.  Patient was given instructions and counseling regarding her condition or for health maintenance advice. Please see specific information pulled into the AVS if appropriate.       Answers for HPI/ROS submitted by the patient on 3/12/2023  Please describe your symptoms.: Follow up appt for abdominal pain  Have you had these symptoms before?: Yes  How long have you been having these symptoms?: Greater than 2 weeks  Please list any medications you are currently taking for this condition.: Miralax  Please describe any probable cause for these symptoms. : Constipation  What is the primary reason for your visit?: Other    Continue MiraLAX as needed  Slowly add Metamucil increase water  Healthy diet low carbohydrate diet modest weight loss over the next year to minimize or decrease fatty liver  Follow-up with Dr. Martinez for routine care

## 2023-05-12 RX ORDER — CETIRIZINE HYDROCHLORIDE 10 MG/1
10 TABLET ORAL DAILY PRN
COMMUNITY

## 2023-05-15 ENCOUNTER — ANESTHESIA (OUTPATIENT)
Dept: GASTROENTEROLOGY | Facility: HOSPITAL | Age: 58
End: 2023-05-15
Payer: COMMERCIAL

## 2023-05-15 ENCOUNTER — ANESTHESIA EVENT (OUTPATIENT)
Dept: GASTROENTEROLOGY | Facility: HOSPITAL | Age: 58
End: 2023-05-15
Payer: COMMERCIAL

## 2023-05-15 ENCOUNTER — HOSPITAL ENCOUNTER (OUTPATIENT)
Facility: HOSPITAL | Age: 58
Setting detail: HOSPITAL OUTPATIENT SURGERY
Discharge: HOME | End: 2023-05-15
Attending: COLON & RECTAL SURGERY | Admitting: COLON & RECTAL SURGERY
Payer: COMMERCIAL

## 2023-05-15 VITALS
SYSTOLIC BLOOD PRESSURE: 118 MMHG | BODY MASS INDEX: 27.79 KG/M2 | WEIGHT: 151 LBS | DIASTOLIC BLOOD PRESSURE: 68 MMHG | RESPIRATION RATE: 16 BRPM | HEIGHT: 62 IN | OXYGEN SATURATION: 96 % | HEART RATE: 62 BPM

## 2023-05-15 DIAGNOSIS — R10.32 LEFT LOWER QUADRANT PAIN: ICD-10-CM

## 2023-05-15 PROCEDURE — 88305 TISSUE EXAM BY PATHOLOGIST: CPT | Performed by: COLON & RECTAL SURGERY

## 2023-05-15 PROCEDURE — 25010000002 PROPOFOL 10 MG/ML EMULSION: Performed by: ANESTHESIOLOGY

## 2023-05-15 RX ORDER — LIDOCAINE HYDROCHLORIDE 20 MG/ML
INJECTION, SOLUTION INFILTRATION; PERINEURAL AS NEEDED
Status: DISCONTINUED | OUTPATIENT
Start: 2023-05-15 | End: 2023-05-15 | Stop reason: SURG

## 2023-05-15 RX ORDER — SODIUM CHLORIDE, SODIUM LACTATE, POTASSIUM CHLORIDE, CALCIUM CHLORIDE 600; 310; 30; 20 MG/100ML; MG/100ML; MG/100ML; MG/100ML
30 INJECTION, SOLUTION INTRAVENOUS CONTINUOUS
Status: DISCONTINUED | OUTPATIENT
Start: 2023-05-15 | End: 2023-05-15 | Stop reason: HOSPADM

## 2023-05-15 RX ORDER — SODIUM CHLORIDE 0.9 % (FLUSH) 0.9 %
10 SYRINGE (ML) INJECTION AS NEEDED
Status: DISCONTINUED | OUTPATIENT
Start: 2023-05-15 | End: 2023-05-15 | Stop reason: HOSPADM

## 2023-05-15 RX ORDER — SODIUM CHLORIDE 9 MG/ML
40 INJECTION, SOLUTION INTRAVENOUS AS NEEDED
Status: DISCONTINUED | OUTPATIENT
Start: 2023-05-15 | End: 2023-05-15 | Stop reason: HOSPADM

## 2023-05-15 RX ORDER — SODIUM CHLORIDE 0.9 % (FLUSH) 0.9 %
10 SYRINGE (ML) INJECTION EVERY 12 HOURS SCHEDULED
Status: DISCONTINUED | OUTPATIENT
Start: 2023-05-15 | End: 2023-05-15 | Stop reason: HOSPADM

## 2023-05-15 RX ORDER — PROPOFOL 10 MG/ML
VIAL (ML) INTRAVENOUS AS NEEDED
Status: DISCONTINUED | OUTPATIENT
Start: 2023-05-15 | End: 2023-05-15 | Stop reason: SURG

## 2023-05-15 RX ADMIN — SODIUM CHLORIDE, POTASSIUM CHLORIDE, SODIUM LACTATE AND CALCIUM CHLORIDE 30 ML/HR: 600; 310; 30; 20 INJECTION, SOLUTION INTRAVENOUS at 10:20

## 2023-05-15 RX ADMIN — PROPOFOL 100 MCG/KG/MIN: 10 INJECTION, EMULSION INTRAVENOUS at 11:09

## 2023-05-15 RX ADMIN — LIDOCAINE HYDROCHLORIDE 60 MG: 20 INJECTION, SOLUTION INFILTRATION; PERINEURAL at 11:07

## 2023-05-15 RX ADMIN — PROPOFOL 100 MG: 10 INJECTION, EMULSION INTRAVENOUS at 11:07

## 2023-05-15 NOTE — ANESTHESIA PREPROCEDURE EVALUATION
Anesthesia Evaluation                  Airway   Mallampati: I  TM distance: >3 FB  Neck ROM: full  No difficulty expected  Dental - normal exam     Pulmonary - normal exam   Cardiovascular - normal exam    (+) hyperlipidemia,       Neuro/Psych  (+) headaches,    GI/Hepatic/Renal/Endo    (+)   hepatitis, liver disease, thyroid problem hypothyroidism    Musculoskeletal     Abdominal   (+) obese,     Bowel sounds: normal.   Substance History      OB/GYN          Other          Other Comment: hemachromatosis                  Anesthesia Plan    ASA 2     MAC     intravenous induction     Anesthetic plan, risks, benefits, and alternatives have been provided, discussed and informed consent has been obtained with: patient.        CODE STATUS:

## 2023-05-15 NOTE — DISCHARGE INSTRUCTIONS
For the next 24 hours patient needs to be with a responsible adult.    For 24 hours DO NOT drive, operate machinery, appliances, drink alcohol, make important decisions or sign legal documents.    Start with a light or bland diet if you are feeling sick to your stomach otherwise advance to regular diet as tolerated.    Follow recommendations on procedure report if provided by your doctor.    Call Dr Sanchez for problems [phone]    Problems may include but not limited to: large amounts of bleeding, trouble breathing, repeated vomiting, severe unrelieved pain, fever or chills.

## 2023-05-15 NOTE — H&P
Yomaira Ball is a 57 y.o. female  who is referred by Riley Sanchez MD for a colonoscopy. She   has an indications: Abdominal pain.     She denies any change in bowel function, melena, or hematochezia.    Past Medical History:   Diagnosis Date   • Allergic    • Allergic rhinitis     FOLLOWED BY DR. RENO UGARTE   • Chronic LLQ pain    • Coccydynia 02/2023   • Colon polyps     FOLLOWED BY DR. 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 MARIA E WATT   • History of COVID-19 2022   • Hormone replacement therapy (HRT)     MINIVELLE, ESTRACE   • Hyperglycemia 12/02/2016   • Hyperlipidemia 08/01/2017   • Hypothyroidism    • Laceration of left lower leg 06/18/2022    SEEN AT    • Morning headache    • Multinodular goiter    • ARMENDARIZ (nonalcoholic steatohepatitis)    • Nummular eczema 08/01/2017   • Polycythemia 09/24/2019   • Rectocele     S/P REPAIR   • Seasonal allergies 03/02/2023   • Seborrheic keratosis    • Snoring    • Tick bite 09/17/2018   • Urinary incontinence 05/2015   • Uterine prolapse     S/P REPAIR   • Vitamin D deficiency        Past Surgical History:   Procedure Laterality Date   • ADENOIDECTOMY     • APPENDECTOMY     • CHOLECYSTECTOMY N/A 1998    LAPAROSCOPIC   • COLON RESECTION RIGHT OR TRANSVERSE LAPAROSCOPIC N/A 11/21/2013   • COLONOSCOPY N/A 11/21/2015    WNL, ANASTAMOSIS HEALTHY, RESCOPE IN 5 YRS, DR. JANIYA CHRISTINE AT [facility]   • COLONOSCOPY N/A 11/18/2017    ENTIRE COLON WNL, INTERNAL HEMORRHOIDS, RESCOPE  IN 5 YRS, DR. ALESSIO SOUZA AT [place] [facility]   • COLONOSCOPY N/A 09/11/2020    ENTIRE COLON WNL, RESCOPE IN 5 YRS, DR. JANIYA CHRISTINE AT [facility]   • ENDOSCOPY AND COLONOSCOPY N/A 09/23/2013    LARGE SESSILE SERRATED ADENOMA POLYP IN CECUM, DR. TANIA CARUSO AT [place] [facility]   • FINGER SURGERY Right 09/22/2021    EXCISION OF RIGHT LONG FINGER MUCOUS CYST AND DEBRIDEMENT OF OSTEOPHYTE, DR. GERALDINE LAWRENCE AT [facility]   • HEMICOLOECTOMY W/ ANASTOMOSIS Right 11/21/2013    LAPAROSCOPIC, DR. JANIYA CHRISTINE AT [facility]   • HYSTERECTOMY N/A 05/12/2015    [facility] WITH BSO, AND ENTEROLYSIS, DR. MIGUE BENNETT AT [facility]   • HYSTEROSCOPY ENDOMETRIAL ABLATION N/A 12/09/2011    DR. MARIA E AGARWAL AT [facility]   • KNEE ARTHROSCOPY W/ ACL RECONSTRUCTION Right 12/19/2016    DR. REY MENA AT [facility]   • KNEE ARTHROSCOPY W/ HARDWARE REMOVAL Right 07/10/2017    WITH CHONDROPLASTY, DR. ERY MENA AT [facility]   • POSTERIOR REPAIR N/A 05/12/2015    DR. MIGUE BENNETT AT [facility]   • SEPTOPLASTY     • SEPTOPLASTY, RESECTION INFERIOR TURBINATES Bilateral 02/16/2021    Procedure: SEPTOPLASTY, RESECTION INFERIOR TURBINATES;  Surgeon: Michael Casiano MD;  Location: [facility] MAIN OR;  Service: ENT;  Laterality: Bilateral;   • TONSILLECTOMY Bilateral     Childhood   • UTEROSACRAL SUSPENSION LAPAROSCOPIC N/A 05/12/2015    WITH TVT AND CYSTOSCOPY, DR. MIGUE BENNETT AT [facility]       Medications Prior to Admission   Medication Sig Dispense Refill Last Dose   • cetirizine (zyrTEC) 10 MG tablet Take 1 tablet by mouth Daily As Needed for Allergies.   Past Week   • Coenzyme Q-10 100 MG capsule Take 1 capsule by mouth Daily.   Past Week   • levothyroxine (SYNTHROID, LEVOTHROID) 100 MCG tablet Take 1 tablet by mouth once daily (Patient taking differently: Take 1 tablet by mouth Every Night.) 90 tablet 0 5/14/2023   • Vitamin D, Cholecalciferol, 50 MCG (2000 UT) capsule Take 1 capsule by mouth Daily.   Past Week       Allergies   Allergen Reactions   •  Benzalkonium Chloride Swelling   • Cefdinir Unknown - Low Severity       Family History   Problem Relation Age of Onset   • Cancer Mother    • Diabetes Mother    • Hypertension Mother    • Heart disease Father    • Hypertension Father    • Cancer Father    • Diabetes Sister    • Hypertension Sister    • Diabetes Brother    • Hypertension Brother    • Cancer Brother    • Stroke Brother    • Diabetes Brother    • Hypertension Son    • Cancer Maternal Aunt    • Breast cancer Maternal Aunt         Diagnosed in her 50s   • Malig Hyperthermia Neg Hx        Social History     Socioeconomic History   • Marital status:      Spouse name: Rogerio   • Number of children: 3   • Years of education: College   Tobacco Use   • Smoking status: Never     Passive exposure: Never   • Smokeless tobacco: Never   Vaping Use   • Vaping Use: Never used   Substance and Sexual Activity   • Alcohol use: Yes     Comment: RARELY   • Drug use: No   • Sexual activity: Yes     Partners: Male     Birth control/protection: Post-menopausal, Vasectomy, Hysterectomy       Review of Systems   Gastrointestinal: Negative for abdominal pain, nausea and vomiting.   All other systems reviewed and are negative.      Vitals:    05/15/23 1018   BP: 126/75   Pulse: 71   Resp: 16   SpO2: 98%         Physical Exam  Constitutional:       Appearance: She is well-developed.   HENT:      Head: Normocephalic and atraumatic.   Eyes:      Pupils: Pupils are equal, round, and reactive to light.   Cardiovascular:      Rate and Rhythm: Regular rhythm.   Pulmonary:      Effort: Pulmonary effort is normal.   Abdominal:      General: There is no distension.      Palpations: Abdomen is soft.   Musculoskeletal:         General: Normal range of motion.   Skin:     General: Skin is warm and dry.   Neurological:      Mental Status: She is alert and oriented to person, place, and time.   Psychiatric:         Thought Content: Thought content normal.         Judgment: Judgment  normal.           Assessment & Plan      indications: Abdominal pain        I recommend colonoscopy.  I described risks, benefits of the procedure with the patient including but not limited to bleeding, infection, possibility of perforation and possible polypectomy. All of the patient's questions were answered and they would like to proceed with the above recommendations.

## 2023-05-15 NOTE — ANESTHESIA POSTPROCEDURE EVALUATION
Patient: Yomaira Ball    Procedure Summary     Date: 05/15/23 Room / Location: [facility] ENDOSCOPY 8 / [facility] ENDOSCOPY    Anesthesia Start: 1105 Anesthesia Stop: 1127    Procedure: COLONOSCOPY TO CECUM WITH COLD FORCEP POLYPECTOMY Diagnosis:       Left lower quadrant pain      (Left lower quadrant pain [R10.32])    Surgeons: Riley Sanchez MD Provider: Mandy Cyr MD    Anesthesia Type: MAC ASA Status: 2          Anesthesia Type: MAC    Vitals  Vitals Value Taken Time   /66 05/15/23 1123   Temp     Pulse 72 05/15/23 1123   Resp 16 05/15/23 1123   SpO2 98 % 05/15/23 1123           Post Anesthesia Care and Evaluation    Patient location during evaluation: bedside  Patient participation: complete - patient participated  Level of consciousness: awake  Pain management: adequate    Airway patency: patent  Anesthetic complications: No anesthetic complications    Cardiovascular status: acceptable  Respiratory status: acceptable  Hydration status: acceptable

## 2023-05-16 LAB
LAB AP CASE REPORT: NORMAL
PATH REPORT.FINAL DX SPEC: NORMAL
PATH REPORT.GROSS SPEC: NORMAL

## 2023-06-08 ENCOUNTER — TELEPHONE (OUTPATIENT)
Dept: FAMILY MEDICINE CLINIC | Facility: CLINIC | Age: 58
End: 2023-06-08

## 2023-06-08 RX ORDER — LEVOTHYROXINE SODIUM 0.1 MG/1
TABLET ORAL
Qty: 90 TABLET | Refills: 0 | Status: SHIPPED | OUTPATIENT
Start: 2023-06-08

## 2023-06-08 NOTE — TELEPHONE ENCOUNTER
Caller: Yomaira Ball    Relationship to patient: Self    Best call back number: [phone]    Chief complaint: NA    Type of visit: PHYSICAL     Requested date: ASAP     If rescheduling, when is the original appointment: 7/18/23     Additional notes:PLEASE REACH OUT TO PATIENT TO RESCHEDULE PHYSICAL.

## 2023-08-29 RX ORDER — LEVOTHYROXINE SODIUM 0.1 MG/1
TABLET ORAL
Qty: 90 TABLET | Refills: 0 | Status: SHIPPED | OUTPATIENT
Start: 2023-08-29

## 2023-08-29 NOTE — TELEPHONE ENCOUNTER
Rx Refill Note  Requested Prescriptions     Pending Prescriptions Disp Refills    levothyroxine (SYNTHROID, LEVOTHROID) 100 MCG tablet [Pharmacy Med Name: Levothyroxine Sodium 100 MCG Oral Tablet] 90 tablet 0     Sig: Take 1 tablet by mouth once daily      Last office visit with prescribing clinician: 7/13/2023   Last telemedicine visit with prescribing clinician: Visit date not found   Next office visit with prescribing clinician: Visit date not found                         Would you like a call back once the refill request has been completed: [] Yes [] No    If the office needs to give you a call back, can they leave a voicemail: [] Yes [] No    Nikhil Rebollar MA  08/29/23, 13:10 EDT

## 2023-12-12 RX ORDER — LEVOTHYROXINE SODIUM 0.1 MG/1
TABLET ORAL
Qty: 90 TABLET | Refills: 0 | Status: SHIPPED | OUTPATIENT
Start: 2023-12-12

## 2024-01-31 ENCOUNTER — HOSPITAL ENCOUNTER (EMERGENCY)
Facility: HOSPITAL | Age: 59
Discharge: HOME | End: 2024-01-31
Attending: EMERGENCY MEDICINE
Payer: COMMERCIAL

## 2024-01-31 VITALS
OXYGEN SATURATION: 98 % | TEMPERATURE: 98.6 F | WEIGHT: 156 LBS | SYSTOLIC BLOOD PRESSURE: 158 MMHG | HEART RATE: 85 BPM | RESPIRATION RATE: 16 BRPM | DIASTOLIC BLOOD PRESSURE: 96 MMHG | HEIGHT: 61 IN | BODY MASS INDEX: 29.45 KG/M2

## 2024-01-31 DIAGNOSIS — N39.0 ACUTE UTI: Primary | ICD-10-CM

## 2024-01-31 LAB
BILIRUB UR QL STRIP: NEGATIVE
CLARITY UR: ABNORMAL
COLOR UR: YELLOW
GLUCOSE UR STRIP-MCNC: NEGATIVE MG/DL
HGB UR QL STRIP.AUTO: ABNORMAL
KETONES UR QL STRIP: NEGATIVE
LEUKOCYTE ESTERASE UR QL STRIP.AUTO: ABNORMAL
NITRITE UR QL STRIP: NEGATIVE
PH UR STRIP.AUTO: 5.5 [PH] (ref 5–8)
PROT UR QL STRIP: ABNORMAL
SP GR UR STRIP: >=1.03 (ref 1–1.03)
UROBILINOGEN UR QL STRIP: ABNORMAL

## 2024-01-31 PROCEDURE — 81003 URINALYSIS AUTO W/O SCOPE: CPT | Performed by: EMERGENCY MEDICINE

## 2024-01-31 PROCEDURE — 25010000002 CEFTRIAXONE PER 250 MG: Performed by: EMERGENCY MEDICINE

## 2024-01-31 PROCEDURE — 99283 EMERGENCY DEPT VISIT LOW MDM: CPT | Performed by: EMERGENCY MEDICINE

## 2024-01-31 PROCEDURE — 99283 EMERGENCY DEPT VISIT LOW MDM: CPT

## 2024-01-31 PROCEDURE — 96372 THER/PROPH/DIAG INJ SC/IM: CPT

## 2024-01-31 RX ORDER — CIPROFLOXACIN 500 MG/1
500 TABLET, FILM COATED ORAL 2 TIMES DAILY
Qty: 14 TABLET | Refills: 0 | Status: SHIPPED | OUTPATIENT
Start: 2024-01-31 | End: 2024-02-07

## 2024-01-31 RX ADMIN — LIDOCAINE HYDROCHLORIDE 1 G: 10 INJECTION, SOLUTION EPIDURAL; INFILTRATION; INTRACAUDAL; PERINEURAL at 21:35

## 2024-02-01 NOTE — FSED PROVIDER NOTE
Subjective   History of Present Illness  The patient is a 58-year-old  female with multiple medical issues, who presents emergency room with complaints of pelvic pain and dysuria.  Patient believes that she has a urinary tract infection.  She reports that her symptoms started yesterday.  She reports persistent symptoms.  She has had pressure in her pelvis along with dysuria, urgency and hesitancy.  She denies any fever.  No back pain.  She is here for evaluation.        Review of Systems   Constitutional: Negative.  Negative for chills, fatigue and fever.   Eyes: Negative.    Respiratory:  Negative for cough, chest tightness and shortness of breath.    Cardiovascular:  Negative for chest pain and palpitations.   Gastrointestinal:  Negative for abdominal pain, diarrhea, nausea and vomiting.   Genitourinary:  Positive for dysuria, frequency, pelvic pain and urgency. Negative for flank pain and menstrual problem.   Musculoskeletal: Negative.    Skin: Negative.  Negative for rash.   Neurological: Negative.  Negative for syncope, weakness, numbness and headaches.   Psychiatric/Behavioral: Negative.     All other systems reviewed and are negative.      Past Medical History:   Diagnosis Date    Allergic     Allergic rhinitis     FOLLOWED BY DR. RENO UGARTE    Chronic LLQ pain     Coccydynia 02/2023    Colon polyps     FOLLOWED BY DR. ALESSIO SOUZA    Constipation 03/02/2023    COVID-19 08/09/2022    Cystitis with hematuria 03/05/2020    Deviated septum 02/2021    S/P REPAIR    Digital mucinous cyst of finger of right hand 09/2021    S/P EXCISION    Elevated bilirubin 09/2021    Elevated ferritin 09/2019    Epistaxis 10/2020    Fall from ladder 12/03/2013    SACRAL CONTUSION, SPRAIN RIGHT KNEE, SEEN AT 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 MARIA E WATT    History of COVID-19 2022    Hormone  replacement therapy (HRT)     RADHA, ESTRACE    Hyperglycemia 12/02/2016    Hyperlipidemia 08/01/2017    Hypothyroidism     Laceration of left lower leg 06/18/2022    SEEN AT     Morning headache     Multinodular goiter     ARMENDARIZ (nonalcoholic steatohepatitis)     Nummular eczema 08/01/2017    Polycythemia 09/24/2019    Rectocele     S/P REPAIR    Seasonal allergies 03/02/2023    Seborrheic keratosis     Snoring     Tick bite 09/17/2018    Urinary incontinence 05/2015    Uterine prolapse     S/P REPAIR    Vitamin D deficiency        Allergies   Allergen Reactions    Benzalkonium Chloride Swelling    Cefdinir Unknown - Low Severity       Past Surgical History:   Procedure Laterality Date    ADENOIDECTOMY      APPENDECTOMY      CHOLECYSTECTOMY N/A 1998    LAPAROSCOPIC    COLON RESECTION RIGHT OR TRANSVERSE LAPAROSCOPIC N/A 11/21/2013    COLONOSCOPY N/A 11/21/2015    WNL, ANASTAMOSIS HEALTHY, RESCOPE IN 5 YRS, DR. JANIYA CHRISTINE AT [facility]    COLONOSCOPY N/A 11/18/2017    ENTIRE COLON WNL, INTERNAL HEMORRHOIDS, RESCOPE IN 5 YRS, DR. ALESSIO SOUZA AT [place] [facility]    COLONOSCOPY N/A 09/11/2020    ENTIRE COLON WNL, RESCOPE IN 5 YRS, DR. JANIYA CHRISTINE AT [facility]    COLONOSCOPY N/A 05/15/2023    5 MM BENIGN POLYP IN SIGMOID, RESCOPE IN 5 YRS, DR. MANISH JOSEPH AT [facility]    ENDOSCOPY AND COLONOSCOPY N/A 09/23/2013    LARGE SESSILE SERRATED ADENOMA POLYP IN CECUM, DR. TANIA CARUSO AT [place] [facility]    FINGER SURGERY Right 09/22/2021    EXCISION OF RIGHT LONG FINGER MUCOUS CYST AND DEBRIDEMENT OF OSTEOPHYTE, DR. GERALDINE LAWRENCE AT [facility]    HEMICOLOECTOMY W/ ANASTOMOSIS Right 11/21/2013    LAPAROSCOPIC, DR. JANIYA CHRISTINE AT [facility]    HYSTERECTOMY N/A 05/12/2015    [facility] WITH BSO, AND ENTEROLYSIS, DR. MIGUE BENNETT AT [facility]    HYSTEROSCOPY ENDOMETRIAL ABLATION N/A 12/09/2011    DR. MARIA E AGARWAL AT [facility]    KNEE ARTHROSCOPY W/ ACL RECONSTRUCTION Right 12/19/2016    DR. REY MENA AT [facility]    KNEE ARTHROSCOPY W/ HARDWARE  REMOVAL Right 07/10/2017    WITH CHONDROPLASTY, DR. REY MENA AT [facility]    POSTERIOR REPAIR N/A 05/12/2015    DR. MIGUE BENNETT AT [facility]    SEPTOPLASTY      SEPTOPLASTY, RESECTION INFERIOR TURBINATES Bilateral 02/16/2021    Procedure: SEPTOPLASTY, RESECTION INFERIOR TURBINATES;  Surgeon: Michael Casiano MD;  Location[place] [facility];  Service: ENT;  Laterality: Bilateral;    TONSILLECTOMY Bilateral     Childhood    UTEROSACRAL SUSPENSION LAPAROSCOPIC N/A 05/12/2015    WITH TVT AND CYSTOSCOPY, DR. MIGUE BENNETT AT [facility]       Family History   Problem Relation Age of Onset    Cancer Mother     Diabetes Mother     Hypertension Mother     Heart disease Father     Hypertension Father     Cancer Father     Diabetes Sister     Hypertension Sister     Diabetes Brother     Hypertension Brother     Cancer Brother     Stroke Brother     Diabetes Brother     Hypertension Son     Cancer Maternal Aunt     Breast cancer Maternal Aunt         Diagnosed in her 50s    Malig Hyperthermia Neg Hx        Social History     Socioeconomic History    Marital status:      Spouse name: Rogerio    Number of children: 3    Years of education: College   Tobacco Use    Smoking status: Never     Passive exposure: Never    Smokeless tobacco: Never   Vaping Use    Vaping Use: Never used   Substance and Sexual Activity    Alcohol use: Yes     Comment: I do not drink very often, maybe 20 drinks per year    Drug use: Never    Sexual activity: Yes     Partners: Male     Birth control/protection: Post-menopausal, Vasectomy, Hysterectomy           Objective   Physical Exam  Vitals and nursing note reviewed.   Constitutional:       General: She is not in acute distress.     Appearance: Normal appearance. She is normal weight.   HENT:      Right Ear: External ear normal.      Left Ear: External ear normal.      Nose: Nose normal.   Cardiovascular:      Rate and Rhythm: Normal rate.   Pulmonary:      Effort: Pulmonary effort is normal.    Abdominal:      Tenderness: There is no abdominal tenderness. There is no right CVA tenderness, left CVA tenderness, guarding or rebound.   Musculoskeletal:         General: Normal range of motion.      Cervical back: Normal range of motion.   Skin:     Capillary Refill: Capillary refill takes less than 2 seconds.   Neurological:      General: No focal deficit present.      Mental Status: She is alert and oriented to person, place, and time.   Psychiatric:         Mood and Affect: Mood normal.         Procedures           ED Course  ED Course as of 01/31/24 2206 Wed Jan 31, 2024 2141 Urinalysis is positive for infection [KZ]      ED Course User Index  [KZ] Héctor Negron MD                                           Medical Decision Making  Patient presents emergency room with an emergent medical condition, dysuria with associated abdominal/flank pain.  Differential diagnosis considered included STD, UTI, cystitis/pyelonephritis, appendicitis, ovarian torsion, ovarian cyst, diverticulitis.  Based on the patient's physical exam results, surgical condition considered unlikely.  Appropriate testing requested.    Patient is positive for UTI.  She is prescribed appropriate antibiotics.  She is given a shot of Rocephin here.      Problems Addressed:  Acute UTI: complicated acute illness or injury    Risk  Prescription drug management.        Final diagnoses:   Acute UTI       ED Disposition  ED Disposition       ED Disposition   Discharge    Condition   Stable    Comment   --               Ellyn Martinez MD  [address] [address]  [phone]    Schedule an appointment as soon as possible for a visit in 1 week  For repeat evaluation         Medication List        New Prescriptions      ciprofloxacin 500 MG tablet  Commonly known as: CIPRO  Take 1 tablet by mouth 2 (Two) Times a Day for 7 days.               Where to Get Your Medications        These medications were sent to [facility]  [address] - [address] [facility] - [phone]  - [phone]   [address] [facility] [address]      Phone: [phone]   ciprofloxacin 500 MG tablet

## 2024-03-06 RX ORDER — LEVOTHYROXINE SODIUM 0.1 MG/1
TABLET ORAL
Qty: 90 TABLET | Refills: 0 | Status: SHIPPED | OUTPATIENT
Start: 2024-03-06

## 2024-06-06 RX ORDER — LEVOTHYROXINE SODIUM 0.1 MG/1
TABLET ORAL
Qty: 90 TABLET | Refills: 0 | Status: SHIPPED | OUTPATIENT
Start: 2024-06-06

## 2024-09-20 ENCOUNTER — TELEPHONE (OUTPATIENT)
Dept: FAMILY MEDICINE CLINIC | Facility: CLINIC | Age: 59
End: 2024-09-20
Payer: COMMERCIAL

## 2024-09-23 DIAGNOSIS — E78.49 OTHER HYPERLIPIDEMIA: Primary | ICD-10-CM

## 2024-09-23 DIAGNOSIS — R73.9 HYPERGLYCEMIA: ICD-10-CM

## 2024-09-23 DIAGNOSIS — E03.4 HYPOTHYROIDISM DUE TO ACQUIRED ATROPHY OF THYROID: ICD-10-CM

## 2024-10-07 ENCOUNTER — TELEPHONE (OUTPATIENT)
Dept: FAMILY MEDICINE CLINIC | Facility: CLINIC | Age: 59
End: 2024-10-07
Payer: COMMERCIAL

## 2024-10-08 ENCOUNTER — TELEPHONE (OUTPATIENT)
Dept: FAMILY MEDICINE CLINIC | Facility: CLINIC | Age: 59
End: 2024-10-08
Payer: COMMERCIAL

## 2024-10-08 RX ORDER — LEVOTHYROXINE SODIUM 100 UG/1
100 TABLET ORAL DAILY
Qty: 45 TABLET | Refills: 0 | Status: SHIPPED | OUTPATIENT
Start: 2024-10-08

## 2024-10-08 NOTE — TELEPHONE ENCOUNTER
Duplicate message.     Today is the first day a message has been sent to me for a refill on levoxyl.  I have sent in enough to last till I see her in November  please let pt know that the refill request was filled as soon as I got the message.

## 2024-10-08 NOTE — TELEPHONE ENCOUNTER
Rx Refill Note  Requested Prescriptions     Pending Prescriptions Disp Refills    levothyroxine (SYNTHROID, LEVOTHROID) 100 MCG tablet 90 tablet 0     Sig: Take 1 tablet by mouth Daily.      Last office visit with prescribing clinician: 7/13/2023   Last telemedicine visit with prescribing clinician: Visit date not found   Next office visit with prescribing clinician: 11/6/2024                         Would you like a call back once the refill request has been completed: [] Yes [] No    If the office needs to give you a call back, can they leave a voicemail: [] Yes [] No    Shivani Mcghee LPN  10/08/24, 11:38 EDT

## 2024-10-08 NOTE — TELEPHONE ENCOUNTER
Scheduled Pt a med refill appt with Juan for 11/06/2024. Pt asked if her Levothyroxine Med can be bridged until her appt since she is completely out of medication. Please advise Pt if this is possible? Thank you!

## 2024-10-18 DIAGNOSIS — E78.49 OTHER HYPERLIPIDEMIA: ICD-10-CM

## 2024-10-18 DIAGNOSIS — R73.9 HYPERGLYCEMIA: ICD-10-CM

## 2024-10-18 DIAGNOSIS — E03.4 HYPOTHYROIDISM DUE TO ACQUIRED ATROPHY OF THYROID: ICD-10-CM

## 2024-10-19 LAB
ALBUMIN SERPL-MCNC: 4.4 G/DL (ref 3.9–4.9)
ALP SERPL-CCNC: 90 IU/L (ref 44–121)
ALT SERPL-CCNC: 44 IU/L (ref 0–32)
AST SERPL-CCNC: 29 IU/L (ref 0–40)
BASOPHILS # BLD AUTO: 0.1 X10E3/UL (ref 0–0.2)
BASOPHILS NFR BLD AUTO: 1 %
BILIRUB SERPL-MCNC: 1.6 MG/DL (ref 0–1.2)
BUN SERPL-MCNC: 12 MG/DL (ref 8–27)
BUN/CREAT SERPL: 18 (ref 12–28)
CALCIUM SERPL-MCNC: 9.6 MG/DL (ref 8.7–10.3)
CHLORIDE SERPL-SCNC: 103 MMOL/L (ref 96–106)
CHOLEST SERPL-MCNC: 179 MG/DL (ref 100–199)
CO2 SERPL-SCNC: 24 MMOL/L (ref 20–29)
CREAT SERPL-MCNC: 0.65 MG/DL (ref 0.57–1)
EGFRCR SERPLBLD CKD-EPI 2021: 100 ML/MIN/1.73
EOSINOPHIL # BLD AUTO: 0.3 X10E3/UL (ref 0–0.4)
EOSINOPHIL NFR BLD AUTO: 4 %
ERYTHROCYTE [DISTWIDTH] IN BLOOD BY AUTOMATED COUNT: 13 % (ref 11.7–15.4)
GLOBULIN SER CALC-MCNC: 2.4 G/DL (ref 1.5–4.5)
GLUCOSE SERPL-MCNC: 98 MG/DL (ref 70–99)
HBA1C MFR BLD: 6.3 % (ref 4.8–5.6)
HCT VFR BLD AUTO: 48.6 % (ref 34–46.6)
HDLC SERPL-MCNC: 48 MG/DL
HGB BLD-MCNC: 15.7 G/DL (ref 11.1–15.9)
IMM GRANULOCYTES # BLD AUTO: 0 X10E3/UL (ref 0–0.1)
IMM GRANULOCYTES NFR BLD AUTO: 0 %
LDLC SERPL CALC-MCNC: 112 MG/DL (ref 0–99)
LDLC/HDLC SERPL: 2.3 RATIO (ref 0–3.2)
LYMPHOCYTES # BLD AUTO: 2.1 X10E3/UL (ref 0.7–3.1)
LYMPHOCYTES NFR BLD AUTO: 31 %
MCH RBC QN AUTO: 29.3 PG (ref 26.6–33)
MCHC RBC AUTO-ENTMCNC: 32.3 G/DL (ref 31.5–35.7)
MCV RBC AUTO: 91 FL (ref 79–97)
MONOCYTES # BLD AUTO: 0.6 X10E3/UL (ref 0.1–0.9)
MONOCYTES NFR BLD AUTO: 9 %
NEUTROPHILS # BLD AUTO: 3.7 X10E3/UL (ref 1.4–7)
NEUTROPHILS NFR BLD AUTO: 55 %
PLATELET # BLD AUTO: 256 X10E3/UL (ref 150–450)
POTASSIUM SERPL-SCNC: 4.6 MMOL/L (ref 3.5–5.2)
PROT SERPL-MCNC: 6.8 G/DL (ref 6–8.5)
RBC # BLD AUTO: 5.35 X10E6/UL (ref 3.77–5.28)
SODIUM SERPL-SCNC: 142 MMOL/L (ref 134–144)
T4 FREE SERPL-MCNC: 1.35 NG/DL (ref 0.82–1.77)
TRIGL SERPL-MCNC: 103 MG/DL (ref 0–149)
TSH SERPL DL<=0.005 MIU/L-ACNC: 3.81 UIU/ML (ref 0.45–4.5)
VLDLC SERPL CALC-MCNC: 19 MG/DL (ref 5–40)
WBC # BLD AUTO: 6.7 X10E3/UL (ref 3.4–10.8)

## 2024-10-22 DIAGNOSIS — D75.1 POLYCYTHEMIA: Primary | ICD-10-CM

## 2024-10-23 LAB
FERRITIN SERPL-MCNC: 355 NG/ML (ref 15–150)
IRON SERPL-MCNC: 101 UG/DL (ref 27–139)
WRITTEN AUTHORIZATION: NORMAL

## 2024-11-06 ENCOUNTER — TELEPHONE (OUTPATIENT)
Dept: FAMILY MEDICINE CLINIC | Facility: CLINIC | Age: 59
End: 2024-11-06

## 2024-11-06 ENCOUNTER — OFFICE VISIT (OUTPATIENT)
Dept: FAMILY MEDICINE CLINIC | Facility: CLINIC | Age: 59
End: 2024-11-06
Payer: COMMERCIAL

## 2024-11-06 VITALS
BODY MASS INDEX: 30.41 KG/M2 | DIASTOLIC BLOOD PRESSURE: 72 MMHG | HEIGHT: 61 IN | WEIGHT: 161.1 LBS | SYSTOLIC BLOOD PRESSURE: 122 MMHG | OXYGEN SATURATION: 99 % | HEART RATE: 68 BPM

## 2024-11-06 DIAGNOSIS — K76.0 FATTY LIVER: Primary | ICD-10-CM

## 2024-11-06 DIAGNOSIS — R73.03 PREDIABETES: Primary | ICD-10-CM

## 2024-11-06 DIAGNOSIS — R73.03 PREDIABETES: ICD-10-CM

## 2024-11-06 DIAGNOSIS — Z13.6 ENCOUNTER FOR SCREENING FOR VASCULAR DISEASE: ICD-10-CM

## 2024-11-06 DIAGNOSIS — R79.89 ELEVATED FERRITIN: ICD-10-CM

## 2024-11-06 DIAGNOSIS — R79.89 ELEVATED LFTS: ICD-10-CM

## 2024-11-06 PROCEDURE — 99396 PREV VISIT EST AGE 40-64: CPT | Performed by: INTERNAL MEDICINE

## 2024-11-06 RX ORDER — TACROLIMUS 1 MG/G
OINTMENT TOPICAL
COMMUNITY
Start: 2024-10-03

## 2024-11-06 RX ORDER — METRONIDAZOLE 7.5 MG/G
GEL TOPICAL
COMMUNITY
Start: 2024-10-01

## 2024-11-06 RX ORDER — LEVOTHYROXINE SODIUM 100 UG/1
100 TABLET ORAL DAILY
Qty: 90 TABLET | Refills: 3 | Status: SHIPPED | OUTPATIENT
Start: 2024-11-06

## 2024-11-06 NOTE — PROGRESS NOTES
Chief Complaint  Labs Only and Med Refill    Patient or patient representative verbalized consent for the use of Ambient Listening during the visit with  Ellyn Martinez MD for chart documentation. 11/6/2024  15:40 EST    Subjective        Yomaira Ball presents to [facility]    History of Present Illness  The patient is here for her yearly physical. She originally made the appointment because she had a rash on her chin with extremely dry lips and needed blood work to refill her levoxl for HT.      She has since seen her dermatologist, who prescribed tacrolimus ointment and metronidazole, which alleviated the rash. However, she continues to experience dryness, particularly on her upper lip. The rash first appeared two months ago, and she has a history of similar rashes on her chin during her youth. She has an allergy to benzalkonium chloride, a common ingredient in many products. She recently purchased a new lipstick of a different color, which she has since stopped using. She has been applying Vaseline for relief. She does not use hand  due to its drying effect on her already dry hands.    She has a past medical history of hypothyroidism and is on 100 mcg daily of Levoxyl. She has a 30-day supply of levothyroxine. Blood work from a couple of weeks ago showed normal thyroid function. She also used to take vitamin D but is not taking it currently.    She has a history of prediabetes, and her A1c was elevated 2 weeks ago at 6.3%, which is an increase from her A1c of 5.8% one year ago. She does not monitor her blood sugar levels at home. Her cholesterol was mildly elevated with an LDL of 112, which is around her baseline. Her sodium was on the high end of normal, indicating possible dehydration. Her hemoglobin was high normal at 15.7, and her hematocrit was slightly elevated at 48.6, which could also be due to dehydration. An iron level was normal and her ferritin was  "elevated.  She has seen hematology in the past and has been ruled out for having hemochromatosis.  She has a past medical history of slight elevation to her ALT over the past few years that was stable. She was previously diagnosed with fatty liver based on liver ultrasound and CT of abdomen.  Most recent imaging of liver was done in 2023 with stable liver steatosis    She recently started a new job as a [employer]  and has been experiencing stress and uncertainty about her employment.  After seeing her recent labs, she has reduced her sugar intake and occasionally consumes whole wheat pasta.    She has experienced two instances of bloody bowel movements, one of which was difficult to pass. She has noticed a change in her stool consistency (more loose), which she attributes to eating [employer] bars. She has reduced her consumption of these bars. She has a history of hemorrhoids and was advised to take Metamucil, but it caused bloating and gas.    . She recently had a mammogram this past summer at [facility]. Her colonoscopy was in 05/2023, and the next one is due in 2028 due to family history.  Polyp that was removed moved in May 2023 was hyperplastic .  she has previously consulted a gastroenterologist but does not recall the details.    SOCIAL HISTORY  She drinks alcohol very little.    FAMILY HISTORY  Her son has hemochromatosis. Her mother had carotid artery disease. Her brother was diabetic and he had a stroke. All her brothers and sisters have diabetes.    ALLERGIES  She is allergic to BENZALKONIUM CHLORIDE.      Objective   Vital Signs:  /72   Pulse 68   Ht 154.9 cm (60.98\")   Wt 73.1 kg (161 lb 1.6 oz)   SpO2 99%   BMI 30.46 kg/m²   Estimated body mass index is 30.46 kg/m² as calculated from the following:    Height as of this encounter: 154.9 cm (60.98\").    Weight as of this encounter: 73.1 kg (161 lb 1.6 oz).            Physical Exam  Vitals and nursing note reviewed.   Constitutional:  "      Appearance: Normal appearance. She is well-developed.   HENT:      Head: Normocephalic and atraumatic.      Right Ear: Tympanic membrane, ear canal and external ear normal.      Left Ear: Tympanic membrane, ear canal and external ear normal.   Eyes:      Extraocular Movements: Extraocular movements intact.      Conjunctiva/sclera: Conjunctivae normal.   Neck:      Vascular: No carotid bruit.   Cardiovascular:      Rate and Rhythm: Normal rate and regular rhythm.      Heart sounds: Normal heart sounds.      Comments: No bruits  Pulmonary:      Effort: Pulmonary effort is normal. No respiratory distress.      Breath sounds: Normal breath sounds. No stridor. No wheezing, rhonchi or rales.   Chest:      Chest wall: No tenderness.   Abdominal:      General: Bowel sounds are normal. There is no distension.      Palpations: Abdomen is soft. There is no mass.      Tenderness: There is no abdominal tenderness. There is no guarding or rebound.      Hernia: No hernia is present.   Musculoskeletal:      Cervical back: Neck supple.      Right lower leg: No edema.      Left lower leg: No edema.   Lymphadenopathy:      Cervical: No cervical adenopathy.   Skin:     General: Skin is warm.   Neurological:      General: No focal deficit present.      Mental Status: She is alert and oriented to person, place, and time. Mental status is at baseline.   Psychiatric:         Mood and Affect: Mood normal.         Behavior: Behavior normal.         Thought Content: Thought content normal.         Judgment: Judgment normal.        Result Review :                   Assessment and Plan   Diagnoses and all orders for this visit:    1. Fatty liver (Primary)  -     Ambulatory Referral to Gastroenterology  -     Comprehensive Metabolic Panel; Future    2. Elevated LFTs  -     Ambulatory Referral to Gastroenterology  -     Comprehensive Metabolic Panel; Future    3. Elevated ferritin  -     Ambulatory Referral to Gastroenterology  -      Ferritin; Future  -     Comprehensive Metabolic Panel; Future  -     CBC & Differential; Future    4. Encounter for screening for vascular disease  -     Vascular Screening (Bundle) CAR; Future    5. Prediabetes  -     Vascular Screening (Bundle) CAR; Future  -     Hemoglobin A1c; Future    Other orders  -     levothyroxine (SYNTHROID, LEVOTHROID) 100 MCG tablet; Take 1 tablet by mouth Daily.  Dispense: 90 tablet; Refill: 3             Assessment & Plan  1. Hypothyroidism.  Her thyroid function is within normal limits. She will continue her current dose of Levoxyl 100 mcg daily. A refill for Levoxyl will be sent to her pharmacy.    2. Prediabetes.  Her A1c was elevated at 6.3% two weeks ago, increased from 5.8% one year ago. She is advised to follow a diet low in sugar and fat. Monitoring blood sugar levels at home with a glucometer is recommended. If she chooses to purchase a glucometer, she can either go through her insurance or buy one out-of-pocket. A1c will be rechecked in 3 months.  Recommend weight loss.  Recommend exercise.  Recommend decreasing sugar and carbohydrate intake in diet recommend avoiding alcohol    3. Dehydration.  Elevated hematocrit (48.6) and sodium levels suggest possible dehydration. She is advised to increase her fluid intake.    4. Elevated Ferritin.  Ferritin levels have increased from 200 in 2015 to 355 currently. Iron levels are normal, but ferritin is slightly elevated. A referral to a gastroenterologist will be made to evaluate the risk of fibrosis or cirrhosis due to history of fatty liver and to determine if further testing is needed.  He has been evaluated by hematology due to elevated ferritin levels in 2019 and was told she did not have hemochromatosis.  Her serum iron level is normal.    5. Fatty Liver.  ALT is slightly elevated at 44.  AST remains normal.  Previous liver ultrasound in 2015 revealed fatty infiltration, and a CT scan of the abdomen in 2023 confirmed fatty  liver, able. Lifestyle modifications, including exercise, weight loss, and a diet low in sugar and fat, are encouraged. Liver function will be rechecked in 3 months.  Referral to gastroenterologist sent    6. Gilbert's Syndrome.  Bilirubin is slightly elevated, likely due to Gilbert's syndrome. No specific treatment is required.    7. Loose Stools.  Benefiber is recommended for loose stools. She should start with small amounts to avoid bloating and gradually increase as tolerated.    8. Health Maintenance.  Her last mammogram was in summer 2024.  [facility], for copy of her most recent mammogram which was reported to me as negative, and her colonoscopy was in May 2023, with the next one due in 2028. A vascular screening package, including carotid artery screening, aortic aneurysm screening, and peripheral artery disease screening, will be ordered.    Follow-up  Return in 3 months for recheck of liver function and A1c.         Follow Up   Return in about 6 months (around 5/6/2025) for Recheck.  Patient was given instructions and counseling regarding her condition or for health maintenance advice. Please see specific information pulled into the AVS if appropriate.           Ellyn Martinez MD   15:47 EST   [unfilled]     Answers submitted by the patient for this visit:  Other (Submitted on 11/4/2024)  Please describe your symptoms.: Originally called for appt because i had a rash on my chin and extremely dry lips. Then had to have blood work to have my prescription refilled. Niw need to discuss results with Dr. Martinez.  Have you had these symptoms before?: No  How long have you been having these symptoms?: Greater than 2 weeks  Please list any medications you are currently taking for this condition.: Tacrolimus ointment, Merronidazole  Please describe any probable cause for these symptoms. : Unknown  Primary Reason for Visit (Submitted on 11/4/2024)  What is the primary reason for  your visit?: Problem Not Listed

## 2024-11-06 NOTE — TELEPHONE ENCOUNTER
Patient wanted to see if she needs to do a A1c on her next labs to be completed before next visit. Please place order if appropriate.

## 2025-05-06 NOTE — PROGRESS NOTES
"Chief Complaint   Patient presents with    Elevated Hepatic Enzymes         History of Present Illness  Patient is a 59-year-old female who presents today for Evaluation, referred for fatty liver, elevated LFTs, and elevated ferritin.    Patient presents today for evaluation of elevated liver enzymes.  She is noted to have elevated ALT dating back to 2022 and has had a consistently elevated bilirubin level.  Her brother has a history of fatty liver and her son has hemochromatosis.  She did have genetic testing completed for hemochromatosis in 2015 that showed she was a carrier for one of the genes but did not have hemochromatosis.    She denies any GI complaints at today's office visit.    She rarely drinks alcohol.    Her sister has psoriasis.     Result Review :       Hemochromatosis mutation (07/17/2015 11:55)    Tissue Pathology Exam (05/15/2023 11:20)    COLONOSCOPY (05/15/2023 11:01)    Ferritin (10/18/2024 09:47)    Compreh Ensive Metabolic Panel (10/18/2024 09:47)    CT Abdomen Pelvis With Contrast (03/02/2023 12:15)    Referral to Gastroenterology for Fatty liver; Elevated LFTs; Elevated ferritin (11/06/2024)    Vital Signs:   /76   Pulse 70   Temp 98.4 °F (36.9 °C)   Ht 156.2 cm (61.5\")   Wt 75.4 kg (166 lb 3.2 oz)   SpO2 98%   BMI 30.89 kg/m²     Body mass index is 30.89 kg/m².     Physical Exam  Vitals reviewed.   Constitutional:       General: She is not in acute distress.     Appearance: She is well-developed.   HENT:      Head: Normocephalic and atraumatic.   Pulmonary:      Effort: Pulmonary effort is normal. No respiratory distress.   Abdominal:      General: Abdomen is flat. Bowel sounds are normal. There is no distension.      Palpations: Abdomen is soft. There is no hepatomegaly.      Tenderness: There is no abdominal tenderness.   Skin:     General: Skin is dry.      Coloration: Skin is not pale.   Neurological:      Mental Status: She is alert and oriented to person, place, and " time.   Psychiatric:         Thought Content: Thought content normal.           Assessment and Plan    Diagnoses and all orders for this visit:    1. Metabolic dysfunction-associated steatotic liver disease (MASLD) (Primary)  -     US Abdomen Limited; Future    2. Elevated liver enzymes  -     Alpha - 1 - Antitrypsin  -     SAVITA  -     Anti-Smooth Muscle Antibody Titer  -     Celiac Disease Panel  -     Ceruloplasmin  -     Hepatitis Panel, Acute  -     IgG, IgA, IgM  -     Mitochondrial Antibodies, M2  -     Hepatic Function Panel  -     US Abdomen Limited; Future    3. Elevated bilirubin  -     US Abdomen Limited; Future         Discussion  Patient presents today for evaluation with concerns about elevated liver function test.  Liver enzyme elevation not likely due to fatty liver.  We will check lab work to evaluate for alternative cause of liver enzyme elevation and we will schedule updated ultrasound for further evaluation as well.  Elevated bilirubin likely due to Gilbert's syndrome, will check hepatic function panel today to fractionate bilirubin to evaluate for this.    For fatty liver, reviewed dietary and lifestyle modifications to help with this and encouraged weight loss and exercise.  If alternative cause of liver enzyme elevation is ruled out, may consider FibroSure and FibroScan testing to evaluate for any fibrosis from this.  We discussed that over time fatty liver can contribute to fibrosis and even progressed to cirrhosis.    Regarding elevated ferritin, may be due to carrier status of hemochromatosis or potentially could be secondary to fatty liver.          Follow Up   Return for Follow up to review results after testing complete.    Patient Instructions   Check lab work today to evaluate cause of liver enzyme elevation.    Schedule right upper quadrant ultrasound for further evaluation of elevated liver enzymes.     For fatty liver, weight loss is recommended. Recommend following a low fat and low  sugar diet. Recommend management of diabetes and elevated cholesterol with primary care provider if indicated. Regular exercise is recommended. Alcohol avoidance is recommended.

## 2025-05-07 ENCOUNTER — LAB (OUTPATIENT)
Dept: LAB | Facility: HOSPITAL | Age: 60
End: 2025-05-07
Payer: COMMERCIAL

## 2025-05-07 ENCOUNTER — OFFICE VISIT (OUTPATIENT)
Dept: GASTROENTEROLOGY | Facility: CLINIC | Age: 60
End: 2025-05-07
Payer: COMMERCIAL

## 2025-05-07 VITALS
SYSTOLIC BLOOD PRESSURE: 148 MMHG | TEMPERATURE: 98.4 F | HEIGHT: 62 IN | OXYGEN SATURATION: 98 % | WEIGHT: 166.2 LBS | HEART RATE: 70 BPM | BODY MASS INDEX: 30.59 KG/M2 | DIASTOLIC BLOOD PRESSURE: 76 MMHG

## 2025-05-07 DIAGNOSIS — R17 ELEVATED BILIRUBIN: ICD-10-CM

## 2025-05-07 DIAGNOSIS — R74.8 ELEVATED LIVER ENZYMES: ICD-10-CM

## 2025-05-07 DIAGNOSIS — K76.0 METABOLIC DYSFUNCTION-ASSOCIATED STEATOTIC LIVER DISEASE (MASLD): Primary | ICD-10-CM

## 2025-05-07 LAB
ALBUMIN SERPL-MCNC: 4.7 G/DL (ref 3.5–5.2)
ALP SERPL-CCNC: 85 U/L (ref 39–117)
ALPHA1 GLOB MFR UR ELPH: 138 MG/DL (ref 90–200)
ALT SERPL W P-5'-P-CCNC: 53 U/L (ref 1–33)
AST SERPL-CCNC: 31 U/L (ref 1–32)
BILIRUB CONJ SERPL-MCNC: 0.2 MG/DL (ref 0–0.3)
BILIRUB INDIRECT SERPL-MCNC: 1.2 MG/DL
BILIRUB SERPL-MCNC: 1.4 MG/DL (ref 0–1.2)
CERULOPLASMIN SERPL-MCNC: 23 MG/DL (ref 19–39)
HAV IGM SERPL QL IA: NORMAL
HBV CORE IGM SERPL QL IA: NORMAL
HBV SURFACE AG SERPL QL IA: NORMAL
HCV AB SER QL: NORMAL
IGA1 MFR SER: 71 MG/DL (ref 70–400)
IGG1 SER-MCNC: 910 MG/DL (ref 700–1600)
IGM SERPL-MCNC: 40 MG/DL (ref 40–230)
PROT SERPL-MCNC: 7.4 G/DL (ref 6–8.5)

## 2025-05-07 PROCEDURE — 82784 ASSAY IGA/IGD/IGG/IGM EACH: CPT | Performed by: NURSE PRACTITIONER

## 2025-05-07 PROCEDURE — 86015 ACTIN ANTIBODY EACH: CPT | Performed by: NURSE PRACTITIONER

## 2025-05-07 PROCEDURE — 99213 OFFICE O/P EST LOW 20 MIN: CPT | Performed by: NURSE PRACTITIONER

## 2025-05-07 PROCEDURE — 86231 EMA EACH IG CLASS: CPT | Performed by: NURSE PRACTITIONER

## 2025-05-07 PROCEDURE — 86364 TISS TRNSGLTMNASE EA IG CLAS: CPT | Performed by: NURSE PRACTITIONER

## 2025-05-07 PROCEDURE — 86038 ANTINUCLEAR ANTIBODIES: CPT | Performed by: NURSE PRACTITIONER

## 2025-05-07 PROCEDURE — 82390 ASSAY OF CERULOPLASMIN: CPT | Performed by: NURSE PRACTITIONER

## 2025-05-07 PROCEDURE — 86381 MITOCHONDRIAL ANTIBODY EACH: CPT | Performed by: NURSE PRACTITIONER

## 2025-05-07 PROCEDURE — 80074 ACUTE HEPATITIS PANEL: CPT | Performed by: NURSE PRACTITIONER

## 2025-05-07 PROCEDURE — 36415 COLL VENOUS BLD VENIPUNCTURE: CPT | Performed by: NURSE PRACTITIONER

## 2025-05-07 PROCEDURE — 82103 ALPHA-1-ANTITRYPSIN TOTAL: CPT | Performed by: NURSE PRACTITIONER

## 2025-05-07 PROCEDURE — 80076 HEPATIC FUNCTION PANEL: CPT | Performed by: NURSE PRACTITIONER

## 2025-05-07 RX ORDER — TRIAMCINOLONE ACETONIDE 1 MG/G
OINTMENT TOPICAL
COMMUNITY
Start: 2025-02-10

## 2025-05-07 NOTE — PATIENT INSTRUCTIONS
Check lab work today to evaluate cause of liver enzyme elevation.    Schedule right upper quadrant ultrasound for further evaluation of elevated liver enzymes.     For fatty liver, weight loss is recommended. Recommend following a low fat and low sugar diet. Recommend management of diabetes and elevated cholesterol with primary care provider if indicated. Regular exercise is recommended. Alcohol avoidance is recommended.

## 2025-05-08 LAB
ANA SER QL: NEGATIVE
MITOCHONDRIA M2 IGG SER-ACNC: <20 UNITS (ref 0–20)
SMA IGG SER-ACNC: 2 UNITS (ref 0–19)

## 2025-05-09 LAB
ENDOMYSIUM IGA SER QL: NEGATIVE
IGA SERPL-MCNC: 62 MG/DL (ref 87–352)
TTG IGA SER-ACNC: <2 U/ML (ref 0–3)
TTG IGG SER-ACNC: 3 U/ML (ref 0–5)

## 2025-05-20 ENCOUNTER — HOSPITAL ENCOUNTER (OUTPATIENT)
Dept: ULTRASOUND IMAGING | Facility: HOSPITAL | Age: 60
Discharge: HOME | End: 2025-05-20
Payer: COMMERCIAL

## 2025-05-28 ENCOUNTER — HOSPITAL ENCOUNTER (OUTPATIENT)
Dept: ULTRASOUND IMAGING | Facility: HOSPITAL | Age: 60
Discharge: HOME | End: 2025-05-28
Admitting: NURSE PRACTITIONER
Payer: COMMERCIAL

## 2025-05-28 DIAGNOSIS — R74.8 ELEVATED LIVER ENZYMES: ICD-10-CM

## 2025-05-28 DIAGNOSIS — R17 ELEVATED BILIRUBIN: ICD-10-CM

## 2025-05-28 DIAGNOSIS — K76.0 METABOLIC DYSFUNCTION-ASSOCIATED STEATOTIC LIVER DISEASE (MASLD): ICD-10-CM

## 2025-05-28 PROCEDURE — 76705 ECHO EXAM OF ABDOMEN: CPT

## 2025-06-18 ENCOUNTER — HOSPITAL ENCOUNTER (OUTPATIENT)
Dept: ULTRASOUND IMAGING | Facility: HOSPITAL | Age: 60
Discharge: HOME | End: 2025-06-18

## 2025-08-22 ENCOUNTER — OFFICE VISIT (OUTPATIENT)
Dept: FAMILY MEDICINE CLINIC | Facility: CLINIC | Age: 60
End: 2025-08-22
Payer: COMMERCIAL

## 2025-08-22 VITALS
DIASTOLIC BLOOD PRESSURE: 74 MMHG | BODY MASS INDEX: 31.4 KG/M2 | HEIGHT: 61 IN | HEART RATE: 68 BPM | OXYGEN SATURATION: 99 % | WEIGHT: 166.3 LBS | SYSTOLIC BLOOD PRESSURE: 122 MMHG

## 2025-08-22 DIAGNOSIS — R73.9 HYPERGLYCEMIA: ICD-10-CM

## 2025-08-22 DIAGNOSIS — R73.03 PREDIABETES: ICD-10-CM

## 2025-08-22 DIAGNOSIS — R53.83 OTHER FATIGUE: ICD-10-CM

## 2025-08-22 DIAGNOSIS — E55.9 VITAMIN D DEFICIENCY: ICD-10-CM

## 2025-08-22 DIAGNOSIS — E03.9 HYPOTHYROIDISM, UNSPECIFIED TYPE: ICD-10-CM

## 2025-08-22 DIAGNOSIS — E78.2 MIXED HYPERLIPIDEMIA: Primary | ICD-10-CM

## 2025-08-22 PROCEDURE — 99214 OFFICE O/P EST MOD 30 MIN: CPT | Performed by: INTERNAL MEDICINE

## 2025-08-25 DIAGNOSIS — E55.9 VITAMIN D DEFICIENCY: ICD-10-CM

## 2025-08-25 DIAGNOSIS — R53.83 OTHER FATIGUE: ICD-10-CM

## 2025-08-25 DIAGNOSIS — E03.9 HYPOTHYROIDISM, UNSPECIFIED TYPE: ICD-10-CM

## 2025-08-25 DIAGNOSIS — R73.9 HYPERGLYCEMIA: ICD-10-CM

## 2025-08-25 DIAGNOSIS — R73.03 PREDIABETES: ICD-10-CM

## 2025-08-25 DIAGNOSIS — E78.2 MIXED HYPERLIPIDEMIA: ICD-10-CM

## 2025-08-28 LAB
25(OH)D3+25(OH)D2 SERPL-MCNC: 32.8 NG/ML (ref 30–100)
ALBUMIN SERPL-MCNC: 4.7 G/DL (ref 3.9–4.9)
ALP SERPL-CCNC: 92 IU/L (ref 44–121)
ALT SERPL-CCNC: 64 IU/L (ref 0–32)
AST SERPL-CCNC: 46 IU/L (ref 0–40)
BASOPHILS # BLD AUTO: 0.1 X10E3/UL (ref 0–0.2)
BASOPHILS NFR BLD AUTO: 2 %
BILIRUB SERPL-MCNC: 1 MG/DL (ref 0–1.2)
BUN SERPL-MCNC: 11 MG/DL (ref 8–27)
BUN/CREAT SERPL: 16 (ref 12–28)
CALCIUM SERPL-MCNC: 9.7 MG/DL (ref 8.7–10.3)
CHLORIDE SERPL-SCNC: 103 MMOL/L (ref 96–106)
CHOLEST SERPL-MCNC: 188 MG/DL (ref 100–199)
CO2 SERPL-SCNC: 19 MMOL/L (ref 20–29)
CREAT SERPL-MCNC: 0.68 MG/DL (ref 0.57–1)
EGFRCR SERPLBLD CKD-EPI 2021: 99 ML/MIN/1.73
EOSINOPHIL # BLD AUTO: 0.2 X10E3/UL (ref 0–0.4)
EOSINOPHIL NFR BLD AUTO: 3 %
ERYTHROCYTE [DISTWIDTH] IN BLOOD BY AUTOMATED COUNT: 12.7 % (ref 11.7–15.4)
GLOBULIN SER CALC-MCNC: 2.3 G/DL (ref 1.5–4.5)
GLUCOSE SERPL-MCNC: 109 MG/DL (ref 70–99)
HBA1C MFR BLD: 6.4 % (ref 4.8–5.6)
HCT VFR BLD AUTO: 45.7 % (ref 34–46.6)
HDLC SERPL-MCNC: 44 MG/DL
HGB BLD-MCNC: 15.1 G/DL (ref 11.1–15.9)
IMM GRANULOCYTES # BLD AUTO: 0 X10E3/UL (ref 0–0.1)
IMM GRANULOCYTES NFR BLD AUTO: 0 %
LDLC SERPL CALC-MCNC: 126 MG/DL (ref 0–99)
LDLC/HDLC SERPL: 2.9 RATIO (ref 0–3.2)
LYMPHOCYTES # BLD AUTO: 2.1 X10E3/UL (ref 0.7–3.1)
LYMPHOCYTES NFR BLD AUTO: 35 %
MCH RBC QN AUTO: 30.5 PG (ref 26.6–33)
MCHC RBC AUTO-ENTMCNC: 33 G/DL (ref 31.5–35.7)
MCV RBC AUTO: 92 FL (ref 79–97)
MONOCYTES # BLD AUTO: 0.6 X10E3/UL (ref 0.1–0.9)
MONOCYTES NFR BLD AUTO: 10 %
NEUTROPHILS # BLD AUTO: 3.1 X10E3/UL (ref 1.4–7)
NEUTROPHILS NFR BLD AUTO: 50 %
PLATELET # BLD AUTO: 238 X10E3/UL (ref 150–450)
POTASSIUM SERPL-SCNC: 4.5 MMOL/L (ref 3.5–5.2)
PROT SERPL-MCNC: 7 G/DL (ref 6–8.5)
RBC # BLD AUTO: 4.95 X10E6/UL (ref 3.77–5.28)
SODIUM SERPL-SCNC: 140 MMOL/L (ref 134–144)
T4 FREE SERPL-MCNC: 1.38 NG/DL (ref 0.82–1.77)
TRIGL SERPL-MCNC: 101 MG/DL (ref 0–149)
TSH SERPL DL<=0.005 MIU/L-ACNC: 2.3 UIU/ML (ref 0.45–4.5)
VIT B12 SERPL-MCNC: 456 PG/ML (ref 232–1245)
VLDLC SERPL CALC-MCNC: 18 MG/DL (ref 5–40)
WBC # BLD AUTO: 6.1 X10E3/UL (ref 3.4–10.8)

## (undated) DEVICE — GLV SURG BIOGEL LTX PF 7 1/2

## (undated) DEVICE — SUT NLY 2/0 664G

## (undated) DEVICE — KT ORCA ORCAPOD DISP STRL

## (undated) DEVICE — TUBING, SUCTION, 1/4" X 10', STRAIGHT: Brand: MEDLINE

## (undated) DEVICE — ADAPT CLN BIOGUARD AIR/H2O DISP

## (undated) DEVICE — LN SMPL CO2 SHTRM SD STREAM W/M LUER

## (undated) DEVICE — SYR LL 3CC

## (undated) DEVICE — INTENDED FOR TISSUE SEPARATION, AND OTHER PROCEDURES THAT REQUIRE A SHARP SURGICAL BLADE TO PUNCTURE OR CUT.: Brand: BARD-PARKER ® CARBON RIB-BACK BLADES

## (undated) DEVICE — SUT GUT CHRM 4/0 RB1 27IN U203H

## (undated) DEVICE — SENSR O2 OXIMAX FNGR A/ 18IN NONSTR

## (undated) DEVICE — SUT GUT PLN 4/0 SC1 18IN 1824H

## (undated) DEVICE — SUT SILK 2/0 FS BLK 18IN 685G

## (undated) DEVICE — Device

## (undated) DEVICE — ADHS LIQ MASTISOL 2/3ML

## (undated) DEVICE — CANN O2 ETCO2 FITS ALL CONN CO2 SMPL A/ 7IN DISP LF

## (undated) DEVICE — FLEX ADVANTAGE 1500CC: Brand: FLEX ADVANTAGE

## (undated) DEVICE — CODMAN® SURGICAL PATTIES 1/2" X 3" (1.27CM X 7.62CM): Brand: CODMAN®

## (undated) DEVICE — 3M™ STERI-STRIP™ REINFORCED ADHESIVE SKIN CLOSURES, R1547, 1/2 IN X 4 IN (12 MM X 100 MM), 6 STRIPS/ENVELOPE: Brand: 3M™ STERI-STRIP™

## (undated) DEVICE — SINGLE-USE BIOPSY FORCEPS: Brand: RADIAL JAW 4

## (undated) DEVICE — PK ENT 46

## (undated) DEVICE — NDL HYPO SFTY PROEDGE 27G 1 1/4IN GRY

## (undated) DEVICE — DRSNG TELFA PAD NONADH STR 1S 3X4IN

## (undated) DEVICE — STERILE POLYISOPRENE POWDER-FREE SURGICAL GLOVES: Brand: PROTEXIS